# Patient Record
Sex: MALE | Race: ASIAN | Employment: FULL TIME | URBAN - METROPOLITAN AREA
[De-identification: names, ages, dates, MRNs, and addresses within clinical notes are randomized per-mention and may not be internally consistent; named-entity substitution may affect disease eponyms.]

---

## 2017-02-01 PROBLEM — Z20.1 EXPOSURE TO TB: Status: ACTIVE | Noted: 2017-02-01

## 2017-02-01 PROCEDURE — 36415 COLL VENOUS BLD VENIPUNCTURE: CPT | Performed by: FAMILY MEDICINE

## 2017-02-01 PROCEDURE — 86480 TB TEST CELL IMMUN MEASURE: CPT | Performed by: FAMILY MEDICINE

## 2017-10-12 ENCOUNTER — HOSPITAL ENCOUNTER (OUTPATIENT)
Dept: GENERAL RADIOLOGY | Age: 47
Discharge: HOME | End: 2017-10-12
Attending: UROLOGY
Payer: COMMERCIAL

## 2017-10-12 DIAGNOSIS — N20.1 URETERAL STONE: ICD-10-CM

## 2017-10-12 PROCEDURE — 74000 XR ABDOMEN (1 VIEW) (CPT=74000): CPT | Performed by: UROLOGY

## 2017-10-21 ENCOUNTER — HOSPITAL ENCOUNTER (OUTPATIENT)
Dept: ULTRASOUND IMAGING | Age: 47
Discharge: HOME | End: 2017-10-21
Attending: UROLOGY
Payer: COMMERCIAL

## 2017-10-21 DIAGNOSIS — N20.0 KIDNEY STONE: ICD-10-CM

## 2017-10-21 PROCEDURE — 76775 US EXAM ABDO BACK WALL LIM: CPT | Performed by: UROLOGY

## 2017-10-21 PROCEDURE — 76770 US EXAM ABDO BACK WALL COMP: CPT | Performed by: UROLOGY

## 2018-03-22 ENCOUNTER — APPOINTMENT (OUTPATIENT)
Dept: GENERAL RADIOLOGY | Age: 48
End: 2018-03-22
Attending: FAMILY MEDICINE
Payer: COMMERCIAL

## 2018-03-22 ENCOUNTER — HOSPITAL ENCOUNTER (OUTPATIENT)
Age: 48
Discharge: HOME | End: 2018-03-22
Attending: FAMILY MEDICINE
Payer: COMMERCIAL

## 2018-03-22 VITALS
HEART RATE: 87 BPM | TEMPERATURE: 99 F | OXYGEN SATURATION: 97 % | DIASTOLIC BLOOD PRESSURE: 86 MMHG | RESPIRATION RATE: 18 BRPM | SYSTOLIC BLOOD PRESSURE: 127 MMHG

## 2018-03-22 DIAGNOSIS — M54.50 ACUTE LOW BACK PAIN WITHOUT SCIATICA, UNSPECIFIED BACK PAIN LATERALITY: ICD-10-CM

## 2018-03-22 DIAGNOSIS — M51.9 LUMBAR DISC DISEASE: Primary | ICD-10-CM

## 2018-03-22 LAB
POCT BILIRUBIN URINE: NEGATIVE
POCT BLOOD URINE: NEGATIVE
POCT GLUCOSE URINE: NEGATIVE MG/DL
POCT KETONE URINE: NEGATIVE MG/DL
POCT LEUKOCYTE ESTERASE URINE: NEGATIVE
POCT NITRITE URINE: NEGATIVE
POCT PH URINE: 7.5 (ref 5–8)
POCT PROTEIN URINE: NEGATIVE MG/DL
POCT SPECIFIC GRAVITY URINE: 1.02
POCT URINE CLARITY: CLEAR
POCT URINE COLOR: YELLOW
POCT UROBILINOGEN URINE: 0.2 MG/DL

## 2018-03-22 PROCEDURE — 81002 URINALYSIS NONAUTO W/O SCOPE: CPT | Performed by: FAMILY MEDICINE

## 2018-03-22 PROCEDURE — 72110 X-RAY EXAM L-2 SPINE 4/>VWS: CPT | Performed by: FAMILY MEDICINE

## 2018-03-22 PROCEDURE — 99213 OFFICE O/P EST LOW 20 MIN: CPT

## 2018-03-22 PROCEDURE — 99214 OFFICE O/P EST MOD 30 MIN: CPT

## 2018-03-22 RX ORDER — METHYLPREDNISOLONE 4 MG/1
TABLET ORAL
Qty: 1 PACKAGE | Refills: 0 | Status: SHIPPED | OUTPATIENT
Start: 2018-03-22 | End: 2018-05-10 | Stop reason: ALTCHOICE

## 2018-03-22 RX ORDER — TIZANIDINE HYDROCHLORIDE 2 MG/1
2 CAPSULE, GELATIN COATED ORAL 3 TIMES DAILY
Qty: 21 CAPSULE | Refills: 0 | Status: SHIPPED | OUTPATIENT
Start: 2018-03-22 | End: 2018-03-29

## 2018-03-22 NOTE — ED PROVIDER NOTES
Patient Seen in: [facility]    History   Patient presents with:  Back Pain (musculoskeletal)    Stated Complaint: lower back pain     HPI    49 y/o male with previous h/o poliomyelitis presents with c/o Lower back pain for one w and atraumatic. Eyes: Conjunctivae are normal.   Neck: Neck supple. Cardiovascular: Normal rate, regular rhythm and normal heart sounds. Pulmonary/Chest: Effort normal and breath sounds normal. No respiratory distress. He has no wheezes.    Abdominal 9:02     Approved by: Belle Conde DO               MDM     Pt  with acute lower back pain worse with range of motion. Urine dip unremarkable. Unlikely renal stones. X-ray of the lumbar spine shows multilevel disc degeneration and facet arthropathy.   D

## 2018-03-22 NOTE — ED INITIAL ASSESSMENT (HPI)
Pt presents to the immediate care due to right lower back pain x 1 week. Pt does not recall a specific injury. States pain increases with bending forward and certain positions that relieve the pain.  Pt states he has had kidney stones in the past, reports t

## 2019-03-16 ENCOUNTER — HOSPITAL ENCOUNTER (EMERGENCY)
Facility: HOSPITAL | Age: 49
Discharge: LEFT BEFORE DISCHARGE | End: 2019-03-17
Payer: COMMERCIAL

## 2019-03-17 VITALS
HEART RATE: 75 BPM | WEIGHT: 150 LBS | HEIGHT: 65 IN | RESPIRATION RATE: 20 BRPM | OXYGEN SATURATION: 99 % | DIASTOLIC BLOOD PRESSURE: 97 MMHG | BODY MASS INDEX: 24.99 KG/M2 | SYSTOLIC BLOOD PRESSURE: 159 MMHG | TEMPERATURE: 99 F

## 2019-03-17 LAB
ATRIAL RATE: 79 BPM
P AXIS: 47 DEGREES
P-R INTERVAL: 158 MS
Q-T INTERVAL: 386 MS
QRS DURATION: 86 MS
QTC CALCULATION (BEZET): 442 MS
R AXIS: 12 DEGREES
T AXIS: 16 DEGREES
VENTRICULAR RATE: 79 BPM

## 2019-03-17 PROCEDURE — 93005 ELECTROCARDIOGRAM TRACING: CPT

## 2019-03-17 NOTE — ED INITIAL ASSESSMENT (HPI)
Patient reports that about 30 minutes ago he was having some heart burn and then became short of breath. Reports having left chest pain. Denies light headedness, dizziness, nausea, vomiting.

## 2019-08-15 PROBLEM — Z00.00 ROUTINE GENERAL MEDICAL EXAMINATION AT A HEALTH CARE FACILITY: Status: ACTIVE | Noted: 2019-08-15

## 2019-08-15 PROCEDURE — 83013 H PYLORI (C-13) BREATH: CPT | Performed by: FAMILY MEDICINE

## 2019-09-20 ENCOUNTER — WALK IN (OUTPATIENT)
Dept: URGENT CARE | Age: 49
End: 2019-09-20

## 2019-09-20 DIAGNOSIS — Z23 NEED FOR VACCINATION: Primary | ICD-10-CM

## 2019-09-20 PROCEDURE — 90688 IIV4 VACCINE SPLT 0.5 ML IM: CPT | Performed by: NURSE PRACTITIONER

## 2019-09-20 PROCEDURE — 90471 IMMUNIZATION ADMIN: CPT | Performed by: NURSE PRACTITIONER

## 2019-10-25 ENCOUNTER — HOSPITAL ENCOUNTER (OUTPATIENT)
Dept: GENERAL RADIOLOGY | Facility: HOSPITAL | Age: 49
Discharge: HOME | End: 2019-10-25
Attending: UROLOGY
Payer: COMMERCIAL

## 2019-10-25 ENCOUNTER — LAB ENCOUNTER (OUTPATIENT)
Dept: LAB | Facility: HOSPITAL | Age: 49
End: 2019-10-25
Attending: UROLOGY
Payer: COMMERCIAL

## 2019-10-25 DIAGNOSIS — N20.0 KIDNEY STONE: ICD-10-CM

## 2019-10-25 DIAGNOSIS — N40.0 BENIGN PROSTATE HYPERPLASIA: Primary | ICD-10-CM

## 2019-10-25 PROCEDURE — 84153 ASSAY OF PSA TOTAL: CPT

## 2019-10-25 PROCEDURE — 84154 ASSAY OF PSA FREE: CPT

## 2019-10-25 PROCEDURE — 36415 COLL VENOUS BLD VENIPUNCTURE: CPT

## 2019-10-25 PROCEDURE — 74018 RADEX ABDOMEN 1 VIEW: CPT | Performed by: UROLOGY

## 2019-11-11 ENCOUNTER — HOSPITAL ENCOUNTER (OUTPATIENT)
Dept: ULTRASOUND IMAGING | Facility: HOSPITAL | Age: 49
Discharge: HOME | End: 2019-11-11
Attending: UROLOGY
Payer: COMMERCIAL

## 2019-11-11 DIAGNOSIS — N20.0 KIDNEY STONE: ICD-10-CM

## 2019-11-11 PROCEDURE — 76775 US EXAM ABDO BACK WALL LIM: CPT | Performed by: UROLOGY

## 2019-11-14 PROBLEM — M65.341 TRIGGER RING FINGER OF RIGHT HAND: Status: ACTIVE | Noted: 2019-11-14

## 2020-01-30 PROBLEM — M65.341 TRIGGER FINGER, RIGHT RING FINGER: Status: ACTIVE | Noted: 2019-11-14

## 2020-04-28 PROBLEM — U07.1 COVID-19 VIRUS INFECTION: Status: ACTIVE | Noted: 2020-04-28

## 2020-08-30 ENCOUNTER — WALK IN (OUTPATIENT)
Dept: URGENT CARE | Age: 50
End: 2020-08-30

## 2020-08-30 DIAGNOSIS — Z23 INFLUENZA VACCINE NEEDED: Primary | ICD-10-CM

## 2020-08-30 PROCEDURE — 90471 IMMUNIZATION ADMIN: CPT | Performed by: OBSTETRICS & GYNECOLOGY

## 2020-08-30 PROCEDURE — 90686 IIV4 VACC NO PRSV 0.5 ML IM: CPT | Performed by: OBSTETRICS & GYNECOLOGY

## 2020-11-12 ENCOUNTER — LAB ENCOUNTER (OUTPATIENT)
Dept: LAB | Age: 50
End: 2020-11-12
Attending: UROLOGY
Payer: COMMERCIAL

## 2020-11-12 ENCOUNTER — HOSPITAL ENCOUNTER (OUTPATIENT)
Dept: ULTRASOUND IMAGING | Age: 50
Discharge: HOME | End: 2020-11-12
Attending: UROLOGY
Payer: COMMERCIAL

## 2020-11-12 DIAGNOSIS — N20.0 KIDNEY STONE: Primary | ICD-10-CM

## 2020-11-12 DIAGNOSIS — N20.0 KIDNEY STONE: ICD-10-CM

## 2020-11-12 PROCEDURE — 76775 US EXAM ABDO BACK WALL LIM: CPT | Performed by: UROLOGY

## 2020-11-12 PROCEDURE — 84153 ASSAY OF PSA TOTAL: CPT

## 2020-11-12 PROCEDURE — 84154 ASSAY OF PSA FREE: CPT

## 2020-11-12 PROCEDURE — 36415 COLL VENOUS BLD VENIPUNCTURE: CPT

## 2020-12-21 PROBLEM — K21.9 GASTROESOPHAGEAL REFLUX DISEASE, UNSPECIFIED WHETHER ESOPHAGITIS PRESENT: Status: ACTIVE | Noted: 2020-12-21

## 2021-05-13 PROBLEM — J30.1 SEASONAL ALLERGIC RHINITIS DUE TO POLLEN: Status: ACTIVE | Noted: 2021-05-13

## 2022-03-24 PROBLEM — E87.6 HYPOKALEMIA: Status: ACTIVE | Noted: 2022-03-24

## 2022-03-24 PROBLEM — Z99.89 OSA ON CPAP: Status: ACTIVE | Noted: 2022-03-24

## 2022-03-24 PROBLEM — G47.33 OSA ON CPAP: Status: ACTIVE | Noted: 2022-03-24

## 2022-06-27 ENCOUNTER — LAB ENCOUNTER (OUTPATIENT)
Dept: LAB | Facility: HOSPITAL | Age: 52
End: 2022-06-27
Attending: UROLOGY
Payer: COMMERCIAL

## 2022-06-27 ENCOUNTER — HOSPITAL ENCOUNTER (OUTPATIENT)
Dept: ULTRASOUND IMAGING | Facility: HOSPITAL | Age: 52
Discharge: HOME | End: 2022-06-27
Attending: UROLOGY
Payer: COMMERCIAL

## 2022-06-27 DIAGNOSIS — I10 ESSENTIAL HYPERTENSION: ICD-10-CM

## 2022-06-27 DIAGNOSIS — N20.0 KIDNEY STONE: ICD-10-CM

## 2022-06-27 DIAGNOSIS — E78.5 DYSLIPIDEMIA: ICD-10-CM

## 2022-06-27 LAB
ALBUMIN SERPL-MCNC: 3.8 G/DL (ref 3.4–5)
ALBUMIN/GLOB SERPL: 0.9 {RATIO} (ref 1–2)
ALP LIVER SERPL-CCNC: 123 U/L
ALT SERPL-CCNC: 59 U/L
ANION GAP SERPL CALC-SCNC: 8 MMOL/L (ref 0–18)
AST SERPL-CCNC: 29 U/L (ref 15–37)
BILIRUB SERPL-MCNC: 0.5 MG/DL (ref 0.1–2)
BUN BLD-MCNC: 13 MG/DL (ref 7–18)
CALCIUM BLD-MCNC: 9.9 MG/DL (ref 8.5–10.1)
CHLORIDE SERPL-SCNC: 105 MMOL/L (ref 98–112)
CHOLEST SERPL-MCNC: 184 MG/DL (ref ?–200)
CO2 SERPL-SCNC: 25 MMOL/L (ref 21–32)
CREAT BLD-MCNC: 0.72 MG/DL
FASTING PATIENT LIPID ANSWER: NO
FASTING STATUS PATIENT QL REPORTED: NO
GLOBULIN PLAS-MCNC: 4.2 G/DL (ref 2.8–4.4)
GLUCOSE BLD-MCNC: 101 MG/DL (ref 70–99)
HDLC SERPL-MCNC: 45 MG/DL (ref 40–59)
LDLC SERPL CALC-MCNC: 118 MG/DL (ref ?–100)
NONHDLC SERPL-MCNC: 139 MG/DL (ref ?–130)
OSMOLALITY SERPL CALC.SUM OF ELEC: 286 MOSM/KG (ref 275–295)
POTASSIUM SERPL-SCNC: 3.3 MMOL/L (ref 3.5–5.1)
PROT SERPL-MCNC: 8 G/DL (ref 6.4–8.2)
SODIUM SERPL-SCNC: 138 MMOL/L (ref 136–145)
TRIGL SERPL-MCNC: 115 MG/DL (ref 30–149)
VLDLC SERPL CALC-MCNC: 20 MG/DL (ref 0–30)

## 2022-06-27 PROCEDURE — 80061 LIPID PANEL: CPT

## 2022-06-27 PROCEDURE — 36415 COLL VENOUS BLD VENIPUNCTURE: CPT

## 2022-06-27 PROCEDURE — 76775 US EXAM ABDO BACK WALL LIM: CPT | Performed by: UROLOGY

## 2022-06-27 PROCEDURE — 80053 COMPREHEN METABOLIC PANEL: CPT

## 2023-01-01 ENCOUNTER — APPOINTMENT (OUTPATIENT)
Dept: GENERAL RADIOLOGY | Facility: HOSPITAL | Age: 53
DRG: 870 | End: 2023-01-01
Payer: COMMERCIAL

## 2023-01-01 ENCOUNTER — APPOINTMENT (OUTPATIENT)
Dept: GENERAL RADIOLOGY | Facility: HOSPITAL | Age: 53
DRG: 870 | End: 2023-01-01
Attending: INTERNAL MEDICINE
Payer: COMMERCIAL

## 2023-01-01 ENCOUNTER — HOSPITAL ENCOUNTER (INPATIENT)
Facility: HOSPITAL | Age: 53
LOS: 14 days | DRG: 870 | End: 2023-01-01
Attending: EMERGENCY MEDICINE | Admitting: INTERNAL MEDICINE
Payer: COMMERCIAL

## 2023-01-01 ENCOUNTER — APPOINTMENT (OUTPATIENT)
Dept: CT IMAGING | Facility: HOSPITAL | Age: 53
DRG: 870 | End: 2023-01-01
Attending: INTERNAL MEDICINE
Payer: COMMERCIAL

## 2023-01-01 ENCOUNTER — APPOINTMENT (OUTPATIENT)
Dept: CV DIAGNOSTICS | Facility: HOSPITAL | Age: 53
DRG: 870 | End: 2023-01-01
Attending: INTERNAL MEDICINE
Payer: COMMERCIAL

## 2023-01-01 ENCOUNTER — APPOINTMENT (OUTPATIENT)
Dept: GENERAL RADIOLOGY | Facility: HOSPITAL | Age: 53
DRG: 870 | End: 2023-01-01
Attending: NURSE PRACTITIONER
Payer: COMMERCIAL

## 2023-01-01 ENCOUNTER — APPOINTMENT (OUTPATIENT)
Dept: ULTRASOUND IMAGING | Facility: HOSPITAL | Age: 53
DRG: 870 | End: 2023-01-01
Attending: INTERNAL MEDICINE
Payer: COMMERCIAL

## 2023-01-01 ENCOUNTER — APPOINTMENT (OUTPATIENT)
Dept: GENERAL RADIOLOGY | Facility: HOSPITAL | Age: 53
DRG: 870 | End: 2023-01-01
Attending: EMERGENCY MEDICINE
Payer: COMMERCIAL

## 2023-01-01 ENCOUNTER — APPOINTMENT (OUTPATIENT)
Dept: CT IMAGING | Facility: HOSPITAL | Age: 53
DRG: 870 | End: 2023-01-01
Attending: EMERGENCY MEDICINE
Payer: COMMERCIAL

## 2023-01-01 VITALS
TEMPERATURE: 99 F | HEIGHT: 63 IN | BODY MASS INDEX: 32.93 KG/M2 | HEART RATE: 138 BPM | OXYGEN SATURATION: 48 % | RESPIRATION RATE: 13 BRPM | WEIGHT: 185.88 LBS | SYSTOLIC BLOOD PRESSURE: 170 MMHG | DIASTOLIC BLOOD PRESSURE: 92 MMHG

## 2023-01-01 DIAGNOSIS — C22.0 HEPATOCELLULAR CARCINOMA (HCC): ICD-10-CM

## 2023-01-01 DIAGNOSIS — R06.02 SHORTNESS OF BREATH: ICD-10-CM

## 2023-01-01 DIAGNOSIS — D50.0 IRON DEFICIENCY ANEMIA DUE TO CHRONIC BLOOD LOSS: ICD-10-CM

## 2023-01-01 DIAGNOSIS — R00.0 TACHYCARDIA: ICD-10-CM

## 2023-01-01 DIAGNOSIS — D64.9 ANEMIA, UNSPECIFIED TYPE: ICD-10-CM

## 2023-01-01 DIAGNOSIS — J90 PLEURAL EFFUSION: ICD-10-CM

## 2023-01-01 DIAGNOSIS — E87.1 HYPONATREMIA: Primary | ICD-10-CM

## 2023-01-01 LAB
ADENOVIRUS F 40/41 PCR: NEGATIVE
ADENOVIRUS PCR:: NOT DETECTED
ALBUMIN SERPL-MCNC: 1.9 G/DL (ref 3.4–5)
ALBUMIN SERPL-MCNC: 1.9 G/DL (ref 3.4–5)
ALBUMIN SERPL-MCNC: 2 G/DL (ref 3.4–5)
ALBUMIN SERPL-MCNC: 2.1 G/DL (ref 3.4–5)
ALBUMIN SERPL-MCNC: 2.2 G/DL (ref 3.4–5)
ALBUMIN SERPL-MCNC: 2.3 G/DL (ref 3.4–5)
ALBUMIN SERPL-MCNC: 2.4 G/DL (ref 3.4–5)
ALBUMIN SERPL-MCNC: 2.4 G/DL (ref 3.4–5)
ALBUMIN SERPL-MCNC: 2.6 G/DL (ref 3.4–5)
ALBUMIN SERPL-MCNC: 2.7 G/DL (ref 3.4–5)
ALBUMIN SERPL-MCNC: 3.1 G/DL (ref 3.4–5)
ALBUMIN SERPL-MCNC: 3.7 G/DL (ref 3.4–5)
ALBUMIN SERPL-MCNC: 4 G/DL (ref 3.4–5)
ALBUMIN/GLOB SERPL: 0.4 {RATIO} (ref 1–2)
ALP LIVER SERPL-CCNC: 228 U/L
ALP LIVER SERPL-CCNC: 230 U/L
ALP LIVER SERPL-CCNC: 297 U/L
ALP LIVER SERPL-CCNC: 302 U/L
ALP LIVER SERPL-CCNC: 312 U/L
ALP LIVER SERPL-CCNC: 365 U/L
ALT SERPL-CCNC: 174 U/L
ALT SERPL-CCNC: 178 U/L
ALT SERPL-CCNC: 178 U/L
ALT SERPL-CCNC: 202 U/L
ALT SERPL-CCNC: 202 U/L
ALT SERPL-CCNC: 241 U/L
AMMONIA PLAS-MCNC: 27 UMOL/L (ref 11–32)
ANION GAP SERPL CALC-SCNC: 10 MMOL/L (ref 0–18)
ANION GAP SERPL CALC-SCNC: 11 MMOL/L (ref 0–18)
ANION GAP SERPL CALC-SCNC: 13 MMOL/L (ref 0–18)
ANION GAP SERPL CALC-SCNC: 3 MMOL/L (ref 0–18)
ANION GAP SERPL CALC-SCNC: 5 MMOL/L (ref 0–18)
ANION GAP SERPL CALC-SCNC: 5 MMOL/L (ref 0–18)
ANION GAP SERPL CALC-SCNC: 6 MMOL/L (ref 0–18)
ANION GAP SERPL CALC-SCNC: 7 MMOL/L (ref 0–18)
ANION GAP SERPL CALC-SCNC: 8 MMOL/L (ref 0–18)
ANION GAP SERPL CALC-SCNC: 9 MMOL/L (ref 0–18)
ANTIBODY SCREEN: NEGATIVE
ANTIBODY SCREEN: NEGATIVE
APTT PPP: 47.9 SECONDS (ref 23.3–35.6)
ARTERIAL PATENCY WRIST A: POSITIVE
ASPER FLAVUS: NEGATIVE
ASPER FUMIGATUS: NEGATIVE
ASPER NIGER: NEGATIVE
ASPERGILLUS AG BAL/SERUM: 0.08 INDEX
AST SERPL-CCNC: 110 U/L (ref 15–37)
AST SERPL-CCNC: 86 U/L (ref 15–37)
AST SERPL-CCNC: 95 U/L (ref 15–37)
AST SERPL-CCNC: 96 U/L (ref 15–37)
AST SERPL-CCNC: 98 U/L (ref 15–37)
AST SERPL-CCNC: 98 U/L (ref 15–37)
ASTROVIRUS PCR: NEGATIVE
ATRIAL RATE: 128 BPM
ATRIAL RATE: 144 BPM
ATRIAL RATE: 78 BPM
B PARAPERT DNA SPEC QL NAA+PROBE: NOT DETECTED
B PERT DNA SPEC QL NAA+PROBE: NOT DETECTED
BASE EXCESS BLDA CALC-SCNC: -0.3 MMOL/L (ref ?–2)
BASE EXCESS BLDA CALC-SCNC: -11.4 MMOL/L (ref ?–2)
BASE EXCESS BLDA CALC-SCNC: -11.5 MMOL/L (ref ?–2)
BASE EXCESS BLDA CALC-SCNC: -11.6 MMOL/L (ref ?–2)
BASE EXCESS BLDA CALC-SCNC: -12.4 MMOL/L (ref ?–2)
BASE EXCESS BLDA CALC-SCNC: -12.4 MMOL/L (ref ?–2)
BASE EXCESS BLDA CALC-SCNC: -13.4 MMOL/L (ref ?–2)
BASE EXCESS BLDA CALC-SCNC: -6.5 MMOL/L (ref ?–2)
BASE EXCESS BLDA CALC-SCNC: -6.9 MMOL/L (ref ?–2)
BASE EXCESS BLDA CALC-SCNC: -8.3 MMOL/L (ref ?–2)
BASE EXCESS BLDA CALC-SCNC: 5 MMOL/L (ref ?–2)
BASE EXCESS BLDA CALC-SCNC: 5.4 MMOL/L (ref ?–2)
BASE EXCESS BLDA CALC-SCNC: 6 MMOL/L (ref ?–2)
BASOPHILS # BLD AUTO: 0.01 X10(3) UL (ref 0–0.2)
BASOPHILS # BLD AUTO: 0.02 X10(3) UL (ref 0–0.2)
BASOPHILS # BLD AUTO: 0.03 X10(3) UL (ref 0–0.2)
BASOPHILS # BLD AUTO: 0.04 X10(3) UL (ref 0–0.2)
BASOPHILS # BLD AUTO: 0.04 X10(3) UL (ref 0–0.2)
BASOPHILS # BLD AUTO: 0.05 X10(3) UL (ref 0–0.2)
BASOPHILS # BLD: 0 X10(3) UL (ref 0–0.2)
BASOPHILS NFR BLD AUTO: 0.1 %
BASOPHILS NFR BLD AUTO: 0.3 %
BASOPHILS NFR BLD AUTO: 0.6 %
BASOPHILS NFR BLD AUTO: 0.6 %
BASOPHILS NFR BLD AUTO: 0.9 %
BASOPHILS NFR BLD: 0 %
BILIRUB DIRECT SERPL-MCNC: 0.7 MG/DL (ref 0–0.2)
BILIRUB DIRECT SERPL-MCNC: 0.7 MG/DL (ref 0–0.2)
BILIRUB SERPL-MCNC: 0.6 MG/DL (ref 0.1–2)
BILIRUB SERPL-MCNC: 0.8 MG/DL (ref 0.1–2)
BILIRUB SERPL-MCNC: 0.8 MG/DL (ref 0.1–2)
BILIRUB SERPL-MCNC: 1.1 MG/DL (ref 0.1–2)
BILIRUB SERPL-MCNC: 1.1 MG/DL (ref 0.1–2)
BILIRUB SERPL-MCNC: 1.3 MG/DL (ref 0.1–2)
BILIRUB UR QL STRIP.AUTO: NEGATIVE
BLASTOMYCES ABS QN DID: NEGATIVE
BLASTOMYCES ABS QN DID: NEGATIVE
BLOOD TYPE BARCODE: 5100
BLOOD TYPE BARCODE: 5100
BODY TEMPERATURE: 95.5 F
BODY TEMPERATURE: 97.5 F
BODY TEMPERATURE: 98.6 F
BODY TEMPERATURE: 99.5 F
BUN BLD-MCNC: 102 MG/DL (ref 7–18)
BUN BLD-MCNC: 106 MG/DL (ref 7–18)
BUN BLD-MCNC: 114 MG/DL (ref 7–18)
BUN BLD-MCNC: 137 MG/DL (ref 7–18)
BUN BLD-MCNC: 17 MG/DL (ref 7–18)
BUN BLD-MCNC: 20 MG/DL (ref 7–18)
BUN BLD-MCNC: 21 MG/DL (ref 7–18)
BUN BLD-MCNC: 23 MG/DL (ref 7–18)
BUN BLD-MCNC: 26 MG/DL (ref 7–18)
BUN BLD-MCNC: 32 MG/DL (ref 7–18)
BUN BLD-MCNC: 36 MG/DL (ref 7–18)
BUN BLD-MCNC: 48 MG/DL (ref 7–18)
BUN BLD-MCNC: 48 MG/DL (ref 7–18)
BUN BLD-MCNC: 66 MG/DL (ref 7–18)
BUN BLD-MCNC: 79 MG/DL (ref 7–18)
BUN BLD-MCNC: 79 MG/DL (ref 7–18)
BUN BLD-MCNC: 86 MG/DL (ref 7–18)
BUN BLD-MCNC: 86 MG/DL (ref 7–18)
BUN BLD-MCNC: 87 MG/DL (ref 7–18)
BUN BLD-MCNC: 89 MG/DL (ref 7–18)
BUN BLD-MCNC: 89 MG/DL (ref 7–18)
BUN BLD-MCNC: 92 MG/DL (ref 7–18)
C CAYETANENSIS DNA SPEC QL NAA+PROBE: NEGATIVE
C DIFF TOX B STL QL: NEGATIVE
C PNEUM DNA SPEC QL NAA+PROBE: NOT DETECTED
CA-I BLD-SCNC: 1.07 MMOL/L (ref 0.95–1.32)
CA-I BLD-SCNC: 1.07 MMOL/L (ref 0.95–1.32)
CA-I BLD-SCNC: 1.08 MMOL/L (ref 0.95–1.32)
CA-I BLD-SCNC: 1.11 MMOL/L (ref 0.95–1.32)
CA-I BLD-SCNC: 1.15 MMOL/L (ref 0.95–1.32)
CA-I BLD-SCNC: 1.16 MMOL/L (ref 0.95–1.32)
CA-I BLD-SCNC: 1.19 MMOL/L (ref 0.95–1.32)
CA-I BLD-SCNC: 1.21 MMOL/L (ref 0.95–1.32)
CA-I BLD-SCNC: 1.23 MMOL/L (ref 0.95–1.32)
CA-I BLD-SCNC: 1.23 MMOL/L (ref 0.95–1.32)
CA-I BLD-SCNC: 1.24 MMOL/L (ref 0.95–1.32)
CA-I BLD-SCNC: 1.25 MMOL/L (ref 0.95–1.32)
CALCIUM BLD-MCNC: 7.8 MG/DL (ref 8.5–10.1)
CALCIUM BLD-MCNC: 7.9 MG/DL (ref 8.5–10.1)
CALCIUM BLD-MCNC: 8 MG/DL (ref 8.5–10.1)
CALCIUM BLD-MCNC: 8 MG/DL (ref 8.5–10.1)
CALCIUM BLD-MCNC: 8.1 MG/DL (ref 8.5–10.1)
CALCIUM BLD-MCNC: 8.2 MG/DL (ref 8.5–10.1)
CALCIUM BLD-MCNC: 8.2 MG/DL (ref 8.5–10.1)
CALCIUM BLD-MCNC: 8.3 MG/DL (ref 8.5–10.1)
CALCIUM BLD-MCNC: 8.4 MG/DL (ref 8.5–10.1)
CALCIUM BLD-MCNC: 8.6 MG/DL (ref 8.5–10.1)
CALCIUM BLD-MCNC: 8.6 MG/DL (ref 8.5–10.1)
CALCIUM BLD-MCNC: 8.7 MG/DL (ref 8.5–10.1)
CALCIUM BLD-MCNC: 8.8 MG/DL (ref 8.5–10.1)
CALCIUM BLD-MCNC: 8.9 MG/DL (ref 8.5–10.1)
CALCIUM BLD-MCNC: 8.9 MG/DL (ref 8.5–10.1)
CAMPY SP DNA.DIARRHEA STL QL NAA+PROBE: NEGATIVE
CHLORIDE SERPL-SCNC: 102 MMOL/L (ref 98–112)
CHLORIDE SERPL-SCNC: 103 MMOL/L (ref 98–112)
CHLORIDE SERPL-SCNC: 103 MMOL/L (ref 98–112)
CHLORIDE SERPL-SCNC: 104 MMOL/L (ref 98–112)
CHLORIDE SERPL-SCNC: 106 MMOL/L (ref 98–112)
CHLORIDE SERPL-SCNC: 108 MMOL/L (ref 98–112)
CHLORIDE SERPL-SCNC: 109 MMOL/L (ref 98–112)
CHLORIDE SERPL-SCNC: 111 MMOL/L (ref 98–112)
CHLORIDE SERPL-SCNC: 113 MMOL/L (ref 98–112)
CHLORIDE SERPL-SCNC: 115 MMOL/L (ref 98–112)
CHLORIDE SERPL-SCNC: 89 MMOL/L (ref 98–112)
CHLORIDE SERPL-SCNC: 93 MMOL/L (ref 98–112)
CHLORIDE SERPL-SCNC: 95 MMOL/L (ref 98–112)
CHLORIDE SERPL-SCNC: 95 MMOL/L (ref 98–112)
CLARITY UR REFRACT.AUTO: CLEAR
CMV PCR BAL: NOT DETECTED IU/ML
CO2 SERPL-SCNC: 12 MMOL/L (ref 21–32)
CO2 SERPL-SCNC: 13 MMOL/L (ref 21–32)
CO2 SERPL-SCNC: 14 MMOL/L (ref 21–32)
CO2 SERPL-SCNC: 14 MMOL/L (ref 21–32)
CO2 SERPL-SCNC: 15 MMOL/L (ref 21–32)
CO2 SERPL-SCNC: 16 MMOL/L (ref 21–32)
CO2 SERPL-SCNC: 17 MMOL/L (ref 21–32)
CO2 SERPL-SCNC: 17 MMOL/L (ref 21–32)
CO2 SERPL-SCNC: 19 MMOL/L (ref 21–32)
CO2 SERPL-SCNC: 23 MMOL/L (ref 21–32)
CO2 SERPL-SCNC: 23 MMOL/L (ref 21–32)
CO2 SERPL-SCNC: 26 MMOL/L (ref 21–32)
CO2 SERPL-SCNC: 27 MMOL/L (ref 21–32)
CO2 SERPL-SCNC: 27 MMOL/L (ref 21–32)
CO2 SERPL-SCNC: 28 MMOL/L (ref 21–32)
CO2 SERPL-SCNC: 31 MMOL/L (ref 21–32)
COHGB MFR BLD: 0.5 % SAT (ref 0–3)
COHGB MFR BLD: 0.8 % SAT (ref 0–3)
COHGB MFR BLD: 0.8 % SAT (ref 0–3)
COHGB MFR BLD: 0.9 % SAT (ref 0–3)
COHGB MFR BLD: 0.9 % SAT (ref 0–3)
COHGB MFR BLD: 1 % SAT (ref 0–3)
COHGB MFR BLD: 1 % SAT (ref 0–3)
COHGB MFR BLD: 1.2 % SAT (ref 0–3)
COHGB MFR BLD: 1.2 % SAT (ref 0–3)
COHGB MFR BLD: 1.6 % SAT (ref 0–3)
COHGB MFR BLD: 1.7 % SAT (ref 0–3)
COHGB MFR BLD: 1.7 % SAT (ref 0–3)
COLOR FLD: COLORLESS
COLOR UR AUTO: YELLOW
CORONAVIRUS 229E PCR:: NOT DETECTED
CORONAVIRUS HKU1 PCR:: NOT DETECTED
CORONAVIRUS NL63 PCR:: NOT DETECTED
CORONAVIRUS OC43 PCR:: NOT DETECTED
CPAP: 7 CM H2O
CPAP: 8 CM H2O
CREAT BLD-MCNC: 0.62 MG/DL
CREAT BLD-MCNC: 0.63 MG/DL
CREAT BLD-MCNC: 0.63 MG/DL
CREAT BLD-MCNC: 0.64 MG/DL
CREAT BLD-MCNC: 0.65 MG/DL
CREAT BLD-MCNC: 0.74 MG/DL
CREAT BLD-MCNC: 0.75 MG/DL
CREAT BLD-MCNC: 0.81 MG/DL
CREAT BLD-MCNC: 0.94 MG/DL
CREAT BLD-MCNC: 0.96 MG/DL
CREAT BLD-MCNC: 1.12 MG/DL
CREAT BLD-MCNC: 1.12 MG/DL
CREAT BLD-MCNC: 1.29 MG/DL
CREAT BLD-MCNC: 1.3 MG/DL
CREAT BLD-MCNC: 1.43 MG/DL
CREAT BLD-MCNC: 1.54 MG/DL
CREAT BLD-MCNC: 1.63 MG/DL
CREAT BLD-MCNC: 1.63 MG/DL
CREAT BLD-MCNC: 1.69 MG/DL
CREAT BLD-MCNC: 1.72 MG/DL
CREAT BLD-MCNC: 1.84 MG/DL
CREAT BLD-MCNC: 2.21 MG/DL
CREAT UR-SCNC: 86.2 MG/DL
CRYPTOSP DNA SPEC QL NAA+PROBE: NEGATIVE
D DIMER PPP FEU-MCNC: 4.73 UG/ML FEU (ref ?–0.54)
DEPRECATED HBV CORE AB SER IA-ACNC: 2943.9 NG/ML
DIRECT COOMBS POLY: NEGATIVE
EAEC PAA PLAS AGGR+AATA ST NAA+NON-PRB: NEGATIVE
EC STX1+STX2 + H7 FLIC SPEC NAA+PROBE: NEGATIVE
EGFRCR SERPLBLD CKD-EPI 2021: 105 ML/MIN/1.73M2 (ref 60–?)
EGFRCR SERPLBLD CKD-EPI 2021: 107 ML/MIN/1.73M2 (ref 60–?)
EGFRCR SERPLBLD CKD-EPI 2021: 108 ML/MIN/1.73M2 (ref 60–?)
EGFRCR SERPLBLD CKD-EPI 2021: 112 ML/MIN/1.73M2 (ref 60–?)
EGFRCR SERPLBLD CKD-EPI 2021: 112 ML/MIN/1.73M2 (ref 60–?)
EGFRCR SERPLBLD CKD-EPI 2021: 113 ML/MIN/1.73M2 (ref 60–?)
EGFRCR SERPLBLD CKD-EPI 2021: 113 ML/MIN/1.73M2 (ref 60–?)
EGFRCR SERPLBLD CKD-EPI 2021: 114 ML/MIN/1.73M2 (ref 60–?)
EGFRCR SERPLBLD CKD-EPI 2021: 35 ML/MIN/1.73M2 (ref 60–?)
EGFRCR SERPLBLD CKD-EPI 2021: 43 ML/MIN/1.73M2 (ref 60–?)
EGFRCR SERPLBLD CKD-EPI 2021: 47 ML/MIN/1.73M2 (ref 60–?)
EGFRCR SERPLBLD CKD-EPI 2021: 48 ML/MIN/1.73M2 (ref 60–?)
EGFRCR SERPLBLD CKD-EPI 2021: 50 ML/MIN/1.73M2 (ref 60–?)
EGFRCR SERPLBLD CKD-EPI 2021: 50 ML/MIN/1.73M2 (ref 60–?)
EGFRCR SERPLBLD CKD-EPI 2021: 53 ML/MIN/1.73M2 (ref 60–?)
EGFRCR SERPLBLD CKD-EPI 2021: 58 ML/MIN/1.73M2 (ref 60–?)
EGFRCR SERPLBLD CKD-EPI 2021: 65 ML/MIN/1.73M2 (ref 60–?)
EGFRCR SERPLBLD CKD-EPI 2021: 66 ML/MIN/1.73M2 (ref 60–?)
EGFRCR SERPLBLD CKD-EPI 2021: 78 ML/MIN/1.73M2 (ref 60–?)
EGFRCR SERPLBLD CKD-EPI 2021: 78 ML/MIN/1.73M2 (ref 60–?)
EGFRCR SERPLBLD CKD-EPI 2021: 94 ML/MIN/1.73M2 (ref 60–?)
EGFRCR SERPLBLD CKD-EPI 2021: 96 ML/MIN/1.73M2 (ref 60–?)
ENTAMOEBA HISTOLYTICA PCR: NEGATIVE
EOSINOPHIL # BLD AUTO: 0 X10(3) UL (ref 0–0.7)
EOSINOPHIL # BLD AUTO: 0.01 X10(3) UL (ref 0–0.7)
EOSINOPHIL # BLD AUTO: 0.03 X10(3) UL (ref 0–0.7)
EOSINOPHIL # BLD AUTO: 0.03 X10(3) UL (ref 0–0.7)
EOSINOPHIL # BLD: 0 X10(3) UL (ref 0–0.7)
EOSINOPHIL NFR BLD AUTO: 0 %
EOSINOPHIL NFR BLD AUTO: 0.1 %
EOSINOPHIL NFR BLD AUTO: 0.2 %
EOSINOPHIL NFR BLD AUTO: 0.5 %
EOSINOPHIL NFR BLD: 0 %
EPEC EAE GENE STL QL NAA+NON-PROBE: NEGATIVE
ERYTHROCYTE [DISTWIDTH] IN BLOOD BY AUTOMATED COUNT: 14.8 %
ERYTHROCYTE [DISTWIDTH] IN BLOOD BY AUTOMATED COUNT: 15 %
ERYTHROCYTE [DISTWIDTH] IN BLOOD BY AUTOMATED COUNT: 15.3 %
ERYTHROCYTE [DISTWIDTH] IN BLOOD BY AUTOMATED COUNT: 15.9 %
ERYTHROCYTE [DISTWIDTH] IN BLOOD BY AUTOMATED COUNT: 16 %
ERYTHROCYTE [DISTWIDTH] IN BLOOD BY AUTOMATED COUNT: 16.5 %
ERYTHROCYTE [DISTWIDTH] IN BLOOD BY AUTOMATED COUNT: 17.2 %
ERYTHROCYTE [DISTWIDTH] IN BLOOD BY AUTOMATED COUNT: 17.6 %
ERYTHROCYTE [DISTWIDTH] IN BLOOD BY AUTOMATED COUNT: 17.9 %
ERYTHROCYTE [DISTWIDTH] IN BLOOD BY AUTOMATED COUNT: 17.9 %
ERYTHROCYTE [DISTWIDTH] IN BLOOD BY AUTOMATED COUNT: 18.4 %
ERYTHROCYTE [DISTWIDTH] IN BLOOD BY AUTOMATED COUNT: 18.8 %
ERYTHROCYTE [DISTWIDTH] IN BLOOD BY AUTOMATED COUNT: 19.5 %
ERYTHROCYTE [DISTWIDTH] IN BLOOD BY AUTOMATED COUNT: 20.3 %
ERYTHROCYTE [DISTWIDTH] IN BLOOD BY AUTOMATED COUNT: 20.8 %
ERYTHROCYTE [DISTWIDTH] IN BLOOD BY AUTOMATED COUNT: 21.2 %
EST. AVERAGE GLUCOSE BLD GHB EST-MCNC: 131 MG/DL (ref 68–126)
ETEC LTA+ST1A+ST1B TOX ST NAA+NON-PROBE: NEGATIVE
EXPIRATORY PRESSURE: 5 CM H2O
FIO2: 50 %
FIO2: 60 %
FIO2: 80 %
FLUAV RNA SPEC QL NAA+PROBE: NOT DETECTED
FLUBV RNA SPEC QL NAA+PROBE: NOT DETECTED
FUNGITELL: <31 PG/ML
GIARDIA LAMBLIA PCR: NEGATIVE
GLOBULIN PLAS-MCNC: 4.8 G/DL (ref 2.8–4.4)
GLOBULIN PLAS-MCNC: 4.8 G/DL (ref 2.8–4.4)
GLOBULIN PLAS-MCNC: 4.9 G/DL (ref 2.8–4.4)
GLOBULIN PLAS-MCNC: 5.6 G/DL (ref 2.8–4.4)
GLUCOSE BLD-MCNC: 103 MG/DL (ref 70–99)
GLUCOSE BLD-MCNC: 103 MG/DL (ref 70–99)
GLUCOSE BLD-MCNC: 108 MG/DL (ref 70–99)
GLUCOSE BLD-MCNC: 111 MG/DL (ref 70–99)
GLUCOSE BLD-MCNC: 118 MG/DL (ref 70–99)
GLUCOSE BLD-MCNC: 125 MG/DL (ref 70–99)
GLUCOSE BLD-MCNC: 132 MG/DL (ref 70–99)
GLUCOSE BLD-MCNC: 137 MG/DL (ref 70–99)
GLUCOSE BLD-MCNC: 138 MG/DL (ref 70–99)
GLUCOSE BLD-MCNC: 151 MG/DL (ref 70–99)
GLUCOSE BLD-MCNC: 161 MG/DL (ref 70–99)
GLUCOSE BLD-MCNC: 161 MG/DL (ref 70–99)
GLUCOSE BLD-MCNC: 169 MG/DL (ref 70–99)
GLUCOSE BLD-MCNC: 185 MG/DL (ref 70–99)
GLUCOSE BLD-MCNC: 196 MG/DL (ref 70–99)
GLUCOSE BLD-MCNC: 197 MG/DL (ref 70–99)
GLUCOSE BLD-MCNC: 197 MG/DL (ref 70–99)
GLUCOSE BLD-MCNC: 198 MG/DL (ref 70–99)
GLUCOSE BLD-MCNC: 201 MG/DL (ref 70–99)
GLUCOSE BLD-MCNC: 203 MG/DL (ref 70–99)
GLUCOSE BLD-MCNC: 204 MG/DL (ref 70–99)
GLUCOSE BLD-MCNC: 206 MG/DL (ref 70–99)
GLUCOSE BLD-MCNC: 206 MG/DL (ref 70–99)
GLUCOSE BLD-MCNC: 207 MG/DL (ref 70–99)
GLUCOSE BLD-MCNC: 208 MG/DL (ref 70–99)
GLUCOSE BLD-MCNC: 209 MG/DL (ref 70–99)
GLUCOSE BLD-MCNC: 209 MG/DL (ref 70–99)
GLUCOSE BLD-MCNC: 212 MG/DL (ref 70–99)
GLUCOSE BLD-MCNC: 213 MG/DL (ref 70–99)
GLUCOSE BLD-MCNC: 213 MG/DL (ref 70–99)
GLUCOSE BLD-MCNC: 215 MG/DL (ref 70–99)
GLUCOSE BLD-MCNC: 216 MG/DL (ref 70–99)
GLUCOSE BLD-MCNC: 220 MG/DL (ref 70–99)
GLUCOSE BLD-MCNC: 221 MG/DL (ref 70–99)
GLUCOSE BLD-MCNC: 221 MG/DL (ref 70–99)
GLUCOSE BLD-MCNC: 223 MG/DL (ref 70–99)
GLUCOSE BLD-MCNC: 224 MG/DL (ref 70–99)
GLUCOSE BLD-MCNC: 225 MG/DL (ref 70–99)
GLUCOSE BLD-MCNC: 231 MG/DL (ref 70–99)
GLUCOSE BLD-MCNC: 258 MG/DL (ref 70–99)
GLUCOSE BLD-MCNC: 266 MG/DL (ref 70–99)
GLUCOSE BLD-MCNC: 267 MG/DL (ref 70–99)
GLUCOSE BLD-MCNC: 269 MG/DL (ref 70–99)
GLUCOSE BLD-MCNC: 270 MG/DL (ref 70–99)
GLUCOSE BLD-MCNC: 287 MG/DL (ref 70–99)
GLUCOSE BLD-MCNC: 302 MG/DL (ref 70–99)
GLUCOSE BLD-MCNC: 318 MG/DL (ref 70–99)
GLUCOSE BLD-MCNC: 319 MG/DL (ref 70–99)
GLUCOSE BLD-MCNC: 365 MG/DL (ref 70–99)
GLUCOSE UR STRIP.AUTO-MCNC: 50 MG/DL
HAPTOGLOB SERPL-MCNC: 160 MG/DL (ref 30–200)
HBA1C MFR BLD: 6.2 % (ref ?–5.7)
HCO3 BLDA-SCNC: 14.3 MEQ/L (ref 21–27)
HCO3 BLDA-SCNC: 15.3 MEQ/L (ref 21–27)
HCO3 BLDA-SCNC: 15.3 MEQ/L (ref 21–27)
HCO3 BLDA-SCNC: 15.9 MEQ/L (ref 21–27)
HCO3 BLDA-SCNC: 16 MEQ/L (ref 21–27)
HCO3 BLDA-SCNC: 16.1 MEQ/L (ref 21–27)
HCO3 BLDA-SCNC: 18.5 MEQ/L (ref 21–27)
HCO3 BLDA-SCNC: 19.6 MEQ/L (ref 21–27)
HCO3 BLDA-SCNC: 19.9 MEQ/L (ref 21–27)
HCO3 BLDA-SCNC: 24.7 MEQ/L (ref 21–27)
HCO3 BLDA-SCNC: 28.8 MEQ/L (ref 21–27)
HCO3 BLDA-SCNC: 29.2 MEQ/L (ref 21–27)
HCO3 BLDA-SCNC: 29.6 MEQ/L (ref 21–27)
HCT VFR BLD AUTO: 21.4 %
HCT VFR BLD AUTO: 22.2 %
HCT VFR BLD AUTO: 22.8 %
HCT VFR BLD AUTO: 23 %
HCT VFR BLD AUTO: 23.3 %
HCT VFR BLD AUTO: 23.5 %
HCT VFR BLD AUTO: 23.6 %
HCT VFR BLD AUTO: 23.7 %
HCT VFR BLD AUTO: 24.3 %
HCT VFR BLD AUTO: 24.7 %
HCT VFR BLD AUTO: 26.3 %
HCT VFR BLD AUTO: 29.1 %
HGB BLD-MCNC: 10.2 G/DL
HGB BLD-MCNC: 6.7 G/DL
HGB BLD-MCNC: 7 G/DL
HGB BLD-MCNC: 7.2 G/DL
HGB BLD-MCNC: 7.2 G/DL
HGB BLD-MCNC: 7.3 G/DL
HGB BLD-MCNC: 7.4 G/DL
HGB BLD-MCNC: 7.5 G/DL
HGB BLD-MCNC: 7.5 G/DL
HGB BLD-MCNC: 7.6 G/DL
HGB BLD-MCNC: 7.6 G/DL
HGB BLD-MCNC: 7.7 G/DL
HGB BLD-MCNC: 7.8 G/DL
HGB BLD-MCNC: 7.8 G/DL
HGB BLD-MCNC: 7.9 G/DL
HGB BLD-MCNC: 8 G/DL
HGB BLD-MCNC: 8.1 G/DL
HGB BLD-MCNC: 8.1 G/DL
HGB BLD-MCNC: 8.3 G/DL
HGB BLD-MCNC: 8.5 G/DL
HGB BLD-MCNC: 8.7 G/DL
HGB BLD-MCNC: 9.5 G/DL
HGB RETIC QN AUTO: 31.3 PG (ref 28.2–36.6)
HSV 1 PCR BAL: NOT DETECTED COPIES/ML
HSV 2 PCR BAL: NOT DETECTED COPIES/ML
IMM GRANULOCYTES # BLD AUTO: 0.02 X10(3) UL (ref 0–1)
IMM GRANULOCYTES # BLD AUTO: 0.03 X10(3) UL (ref 0–1)
IMM GRANULOCYTES # BLD AUTO: 0.06 X10(3) UL (ref 0–1)
IMM GRANULOCYTES # BLD AUTO: 0.07 X10(3) UL (ref 0–1)
IMM GRANULOCYTES # BLD AUTO: 0.1 X10(3) UL (ref 0–1)
IMM GRANULOCYTES # BLD AUTO: 0.11 X10(3) UL (ref 0–1)
IMM GRANULOCYTES # BLD AUTO: 0.14 X10(3) UL (ref 0–1)
IMM GRANULOCYTES # BLD AUTO: 0.3 X10(3) UL (ref 0–1)
IMM GRANULOCYTES # BLD AUTO: 0.39 X10(3) UL (ref 0–1)
IMM GRANULOCYTES # BLD AUTO: 0.49 X10(3) UL (ref 0–1)
IMM GRANULOCYTES NFR BLD: 0.3 %
IMM GRANULOCYTES NFR BLD: 0.6 %
IMM GRANULOCYTES NFR BLD: 0.7 %
IMM GRANULOCYTES NFR BLD: 0.8 %
IMM GRANULOCYTES NFR BLD: 0.9 %
IMM GRANULOCYTES NFR BLD: 1 %
IMM GRANULOCYTES NFR BLD: 1.1 %
IMM GRANULOCYTES NFR BLD: 1.8 %
IMM GRANULOCYTES NFR BLD: 2.7 %
IMM GRANULOCYTES NFR BLD: 4.2 %
IMM RETICS NFR: 0.2 RATIO (ref 0.1–0.3)
INR BLD: 1.57 (ref 0.85–1.16)
INSP PRESSURE: 12 CM H2O
INSPIRATION SETTING TIME VENT: 1 %
IRON SATN MFR SERPL: 20 %
IRON SERPL-MCNC: 42 UG/DL
KETONES UR STRIP.AUTO-MCNC: NEGATIVE MG/DL
L PNEUMO AG UR QL: NEGATIVE
L/M: 2 L/MIN
L/M: 2 L/MIN
L/M: 4 L/MIN
L/M: 7 L/MIN
LACTATE BLD-SCNC: 0.8 MMOL/L (ref 0.5–2)
LACTATE BLD-SCNC: 0.8 MMOL/L (ref 0.5–2)
LACTATE BLD-SCNC: 1 MMOL/L (ref 0.5–2)
LACTATE BLD-SCNC: 1 MMOL/L (ref 0.5–2)
LACTATE BLD-SCNC: 1.1 MMOL/L (ref 0.5–2)
LACTATE BLD-SCNC: 1.1 MMOL/L (ref 0.5–2)
LACTATE BLD-SCNC: 1.2 MMOL/L (ref 0.5–2)
LACTATE BLD-SCNC: 1.2 MMOL/L (ref 0.5–2)
LACTATE BLD-SCNC: 1.6 MMOL/L (ref 0.5–2)
LACTATE BLD-SCNC: 1.6 MMOL/L (ref 0.5–2)
LACTATE BLD-SCNC: 2 MMOL/L (ref 0.5–2)
LACTATE BLD-SCNC: 2.2 MMOL/L (ref 0.5–2)
LACTATE SERPL-SCNC: 1.4 MMOL/L (ref 0.4–2)
LACTATE SERPL-SCNC: 1.6 MMOL/L (ref 0.4–2)
LEUKOCYTE ESTERASE UR QL STRIP.AUTO: NEGATIVE
LYMPHOCYTES # BLD AUTO: 0.19 X10(3) UL (ref 1–4)
LYMPHOCYTES # BLD AUTO: 0.58 X10(3) UL (ref 1–4)
LYMPHOCYTES # BLD AUTO: 0.76 X10(3) UL (ref 1–4)
LYMPHOCYTES # BLD AUTO: 0.86 X10(3) UL (ref 1–4)
LYMPHOCYTES # BLD AUTO: 0.89 X10(3) UL (ref 1–4)
LYMPHOCYTES # BLD AUTO: 0.89 X10(3) UL (ref 1–4)
LYMPHOCYTES # BLD AUTO: 1.54 X10(3) UL (ref 1–4)
LYMPHOCYTES # BLD AUTO: 1.57 X10(3) UL (ref 1–4)
LYMPHOCYTES # BLD AUTO: 1.86 X10(3) UL (ref 1–4)
LYMPHOCYTES # BLD AUTO: 2.18 X10(3) UL (ref 1–4)
LYMPHOCYTES NFR BLD AUTO: 1.1 %
LYMPHOCYTES NFR BLD AUTO: 11.1 %
LYMPHOCYTES NFR BLD AUTO: 13.2 %
LYMPHOCYTES NFR BLD AUTO: 14.4 %
LYMPHOCYTES NFR BLD AUTO: 15.5 %
LYMPHOCYTES NFR BLD AUTO: 15.8 %
LYMPHOCYTES NFR BLD AUTO: 16.2 %
LYMPHOCYTES NFR BLD AUTO: 16.2 %
LYMPHOCYTES NFR BLD AUTO: 4.9 %
LYMPHOCYTES NFR BLD AUTO: 6.1 %
LYMPHOCYTES NFR BLD: 0.51 X10(3) UL (ref 1–4)
LYMPHOCYTES NFR BLD: 3 %
MAGNESIUM SERPL-MCNC: 1.8 MG/DL (ref 1.6–2.6)
MAGNESIUM SERPL-MCNC: 1.9 MG/DL (ref 1.6–2.6)
MAGNESIUM SERPL-MCNC: 3 MG/DL (ref 1.6–2.6)
MAGNESIUM SERPL-MCNC: 3.1 MG/DL (ref 1.6–2.6)
MAGNESIUM SERPL-MCNC: 3.2 MG/DL (ref 1.6–2.6)
MAGNESIUM SERPL-MCNC: 3.3 MG/DL (ref 1.6–2.6)
MCH RBC QN AUTO: 26.8 PG (ref 26–34)
MCH RBC QN AUTO: 27.1 PG (ref 26–34)
MCH RBC QN AUTO: 27.2 PG (ref 26–34)
MCH RBC QN AUTO: 27.2 PG (ref 26–34)
MCH RBC QN AUTO: 27.4 PG (ref 26–34)
MCH RBC QN AUTO: 27.5 PG (ref 26–34)
MCH RBC QN AUTO: 27.6 PG (ref 26–34)
MCH RBC QN AUTO: 27.6 PG (ref 26–34)
MCH RBC QN AUTO: 27.7 PG (ref 26–34)
MCH RBC QN AUTO: 27.7 PG (ref 26–34)
MCH RBC QN AUTO: 28.3 PG (ref 26–34)
MCH RBC QN AUTO: 28.3 PG (ref 26–34)
MCH RBC QN AUTO: 28.5 PG (ref 26–34)
MCH RBC QN AUTO: 28.6 PG (ref 26–34)
MCH RBC QN AUTO: 28.8 PG (ref 26–34)
MCH RBC QN AUTO: 29.3 PG (ref 26–34)
MCHC RBC AUTO-ENTMCNC: 30.9 G/DL (ref 31–37)
MCHC RBC AUTO-ENTMCNC: 30.9 G/DL (ref 31–37)
MCHC RBC AUTO-ENTMCNC: 31.1 G/DL (ref 31–37)
MCHC RBC AUTO-ENTMCNC: 31.2 G/DL (ref 31–37)
MCHC RBC AUTO-ENTMCNC: 31.3 G/DL (ref 31–37)
MCHC RBC AUTO-ENTMCNC: 31.5 G/DL (ref 31–37)
MCHC RBC AUTO-ENTMCNC: 31.7 G/DL (ref 31–37)
MCHC RBC AUTO-ENTMCNC: 31.9 G/DL (ref 31–37)
MCHC RBC AUTO-ENTMCNC: 32.3 G/DL (ref 31–37)
MCHC RBC AUTO-ENTMCNC: 32.4 G/DL (ref 31–37)
MCHC RBC AUTO-ENTMCNC: 32.4 G/DL (ref 31–37)
MCHC RBC AUTO-ENTMCNC: 32.5 G/DL (ref 31–37)
MCHC RBC AUTO-ENTMCNC: 32.5 G/DL (ref 31–37)
MCHC RBC AUTO-ENTMCNC: 32.6 G/DL (ref 31–37)
MCHC RBC AUTO-ENTMCNC: 33.3 G/DL (ref 31–37)
MCHC RBC AUTO-ENTMCNC: 33.3 G/DL (ref 31–37)
MCV RBC AUTO: 82.9 FL
MCV RBC AUTO: 83.1 FL
MCV RBC AUTO: 84.3 FL
MCV RBC AUTO: 84.8 FL
MCV RBC AUTO: 85.1 FL
MCV RBC AUTO: 85.6 FL
MCV RBC AUTO: 85.8 FL
MCV RBC AUTO: 86.4 FL
MCV RBC AUTO: 87.3 FL
MCV RBC AUTO: 87.4 FL
MCV RBC AUTO: 87.7 FL
MCV RBC AUTO: 88 FL
MCV RBC AUTO: 88.8 FL
MCV RBC AUTO: 89.8 FL
MCV RBC AUTO: 92.5 FL
MCV RBC AUTO: 94.7 FL
MEAN PRES AIRWAY: 15 CM H2O
METAPNEUMOVIRUS PCR:: NOT DETECTED
METHGB MFR BLD: 0 % SAT (ref 0.4–1.5)
METHGB MFR BLD: 0 % SAT (ref 0.4–1.5)
METHGB MFR BLD: 0.6 % SAT (ref 0.4–1.5)
METHGB MFR BLD: 0.7 % SAT (ref 0.4–1.5)
METHGB MFR BLD: 0.8 % SAT (ref 0.4–1.5)
METHGB MFR BLD: 0.9 % SAT (ref 0.4–1.5)
METHGB MFR BLD: 0.9 % SAT (ref 0.4–1.5)
METHGB MFR BLD: 1 % SAT (ref 0.4–1.5)
METHGB MFR BLD: 1.1 % SAT (ref 0.4–1.5)
MONOCYTES # BLD AUTO: 0.46 X10(3) UL (ref 0.1–1)
MONOCYTES # BLD AUTO: 0.49 X10(3) UL (ref 0.1–1)
MONOCYTES # BLD AUTO: 0.61 X10(3) UL (ref 0.1–1)
MONOCYTES # BLD AUTO: 0.76 X10(3) UL (ref 0.1–1)
MONOCYTES # BLD AUTO: 0.8 X10(3) UL (ref 0.1–1)
MONOCYTES # BLD AUTO: 1.17 X10(3) UL (ref 0.1–1)
MONOCYTES # BLD AUTO: 1.3 X10(3) UL (ref 0.1–1)
MONOCYTES # BLD AUTO: 1.46 X10(3) UL (ref 0.1–1)
MONOCYTES # BLD AUTO: 1.64 X10(3) UL (ref 0.1–1)
MONOCYTES # BLD AUTO: 1.65 X10(3) UL (ref 0.1–1)
MONOCYTES # BLD: 0.34 X10(3) UL (ref 0.1–1)
MONOCYTES NFR BLD AUTO: 11.2 %
MONOCYTES NFR BLD AUTO: 11.8 %
MONOCYTES NFR BLD AUTO: 12.7 %
MONOCYTES NFR BLD AUTO: 4.6 %
MONOCYTES NFR BLD AUTO: 8.2 %
MONOCYTES NFR BLD AUTO: 8.3 %
MONOCYTES NFR BLD AUTO: 9.3 %
MONOCYTES NFR BLD AUTO: 9.3 %
MONOCYTES NFR BLD AUTO: 9.4 %
MONOCYTES NFR BLD AUTO: 9.9 %
MONOCYTES NFR BLD: 2 %
MORPHOLOGY: NORMAL
MRSA DNA SPEC QL NAA+PROBE: NEGATIVE
MYCOPLASMA PNEUMONIA PCR:: NOT DETECTED
NEUTROPHILS # BLD AUTO: 10.36 X10 (3) UL (ref 1.5–7.7)
NEUTROPHILS # BLD AUTO: 10.36 X10(3) UL (ref 1.5–7.7)
NEUTROPHILS # BLD AUTO: 10.63 X10 (3) UL (ref 1.5–7.7)
NEUTROPHILS # BLD AUTO: 10.63 X10(3) UL (ref 1.5–7.7)
NEUTROPHILS # BLD AUTO: 11.75 X10 (3) UL (ref 1.5–7.7)
NEUTROPHILS # BLD AUTO: 11.75 X10(3) UL (ref 1.5–7.7)
NEUTROPHILS # BLD AUTO: 14.43 X10 (3) UL (ref 1.5–7.7)
NEUTROPHILS # BLD AUTO: 15.32 X10 (3) UL (ref 1.5–7.7)
NEUTROPHILS # BLD AUTO: 15.32 X10(3) UL (ref 1.5–7.7)
NEUTROPHILS # BLD AUTO: 3.95 X10 (3) UL (ref 1.5–7.7)
NEUTROPHILS # BLD AUTO: 3.95 X10(3) UL (ref 1.5–7.7)
NEUTROPHILS # BLD AUTO: 4.17 X10 (3) UL (ref 1.5–7.7)
NEUTROPHILS # BLD AUTO: 4.17 X10(3) UL (ref 1.5–7.7)
NEUTROPHILS # BLD AUTO: 4.95 X10 (3) UL (ref 1.5–7.7)
NEUTROPHILS # BLD AUTO: 4.95 X10(3) UL (ref 1.5–7.7)
NEUTROPHILS # BLD AUTO: 7.24 X10 (3) UL (ref 1.5–7.7)
NEUTROPHILS # BLD AUTO: 7.24 X10(3) UL (ref 1.5–7.7)
NEUTROPHILS # BLD AUTO: 8.07 X10 (3) UL (ref 1.5–7.7)
NEUTROPHILS # BLD AUTO: 8.07 X10(3) UL (ref 1.5–7.7)
NEUTROPHILS # BLD AUTO: 9.53 X10 (3) UL (ref 1.5–7.7)
NEUTROPHILS # BLD AUTO: 9.53 X10(3) UL (ref 1.5–7.7)
NEUTROPHILS NFR BLD AUTO: 70 %
NEUTROPHILS NFR BLD AUTO: 73.7 %
NEUTROPHILS NFR BLD AUTO: 73.8 %
NEUTROPHILS NFR BLD AUTO: 74.7 %
NEUTROPHILS NFR BLD AUTO: 74.9 %
NEUTROPHILS NFR BLD AUTO: 76 %
NEUTROPHILS NFR BLD AUTO: 76.2 %
NEUTROPHILS NFR BLD AUTO: 79.9 %
NEUTROPHILS NFR BLD AUTO: 80.9 %
NEUTROPHILS NFR BLD AUTO: 92.4 %
NEUTROPHILS NFR BLD: 93 %
NEUTS BAND NFR BLD: 2 %
NEUTS HYPERSEG # BLD: 16.06 X10(3) UL (ref 1.5–7.7)
NITRITE UR QL STRIP.AUTO: NEGATIVE
NON GYNE INTERPRETATION: NEGATIVE
NOROVIRUS GI/GII PCR: NEGATIVE
NRBC BLD MANUAL-RTO: 3 %
OSMOLALITY SERPL CALC.SUM OF ELEC: 255 MOSM/KG (ref 275–295)
OSMOLALITY SERPL CALC.SUM OF ELEC: 255 MOSM/KG (ref 275–295)
OSMOLALITY SERPL CALC.SUM OF ELEC: 256 MOSM/KG (ref 275–295)
OSMOLALITY SERPL CALC.SUM OF ELEC: 259 MOSM/KG (ref 275–295)
OSMOLALITY SERPL CALC.SUM OF ELEC: 273 MOSM/KG (ref 275–295)
OSMOLALITY SERPL CALC.SUM OF ELEC: 280 MOSM/KG (ref 275–295)
OSMOLALITY SERPL CALC.SUM OF ELEC: 284 MOSM/KG (ref 275–295)
OSMOLALITY SERPL CALC.SUM OF ELEC: 294 MOSM/KG (ref 275–295)
OSMOLALITY SERPL CALC.SUM OF ELEC: 298 MOSM/KG (ref 275–295)
OSMOLALITY SERPL CALC.SUM OF ELEC: 301 MOSM/KG (ref 275–295)
OSMOLALITY SERPL CALC.SUM OF ELEC: 305 MOSM/KG (ref 275–295)
OSMOLALITY SERPL CALC.SUM OF ELEC: 305 MOSM/KG (ref 275–295)
OSMOLALITY SERPL CALC.SUM OF ELEC: 307 MOSM/KG (ref 275–295)
OSMOLALITY SERPL CALC.SUM OF ELEC: 307 MOSM/KG (ref 275–295)
OSMOLALITY SERPL CALC.SUM OF ELEC: 309 MOSM/KG (ref 275–295)
OSMOLALITY SERPL CALC.SUM OF ELEC: 311 MOSM/KG (ref 275–295)
OSMOLALITY SERPL CALC.SUM OF ELEC: 312 MOSM/KG (ref 275–295)
OSMOLALITY SERPL CALC.SUM OF ELEC: 318 MOSM/KG (ref 275–295)
OSMOLALITY SERPL CALC.SUM OF ELEC: 326 MOSM/KG (ref 275–295)
OSMOLALITY SERPL CALC.SUM OF ELEC: 331 MOSM/KG (ref 275–295)
OSMOLALITY SERPL CALC.SUM OF ELEC: 335 MOSM/KG (ref 275–295)
OSMOLALITY SERPL CALC.SUM OF ELEC: 340 MOSM/KG (ref 275–295)
OSMOLALITY UR: 567 MOSM/KG (ref 300–1300)
OSMOLALITY UR: 604 MOSM/KG (ref 300–1300)
OXYHGB MFR BLDA: 96.1 % (ref 92–100)
OXYHGB MFR BLDA: 96.1 % (ref 92–100)
OXYHGB MFR BLDA: 96.3 % (ref 92–100)
OXYHGB MFR BLDA: 96.6 % (ref 92–100)
OXYHGB MFR BLDA: 96.8 % (ref 92–100)
OXYHGB MFR BLDA: 97.2 % (ref 92–100)
OXYHGB MFR BLDA: 97.3 % (ref 92–100)
OXYHGB MFR BLDA: 97.3 % (ref 92–100)
OXYHGB MFR BLDA: 97.5 % (ref 92–100)
OXYHGB MFR BLDA: 97.5 % (ref 92–100)
OXYHGB MFR BLDA: 97.6 % (ref 92–100)
OXYHGB MFR BLDA: 98.3 % (ref 92–100)
P AXIS: 36 DEGREES
P SHIGELLOIDES DNA STL QL NAA+PROBE: NEGATIVE
P-R INTERVAL: 140 MS
P. JIROVECI QPCR BAL: NOT DETECTED COPIES/ML
P/F RATIO: 188 MMHG
P/F RATIO: 192 MMHG
P/F RATIO: 197 MMHG
P/F RATIO: 218 MMHG
P/F RATIO: 230 MMHG
P/F RATIO: 235 MMHG
PARAINFLUENZA 1 PCR:: NOT DETECTED
PARAINFLUENZA 2 PCR:: NOT DETECTED
PARAINFLUENZA 3 PCR:: NOT DETECTED
PARAINFLUENZA 4 PCR:: NOT DETECTED
PAW @ PEAK INSP FLOW SETTING VENT: 25 CM H2O
PAW @ PEAK INSP FLOW SETTING VENT: 30 CM H2O
PCO2 BLDA: 24 MM HG (ref 35–45)
PCO2 BLDA: 25 MM HG (ref 35–45)
PCO2 BLDA: 25 MM HG (ref 35–45)
PCO2 BLDA: 26 MM HG (ref 35–45)
PCO2 BLDA: 27 MM HG (ref 35–45)
PCO2 BLDA: 28 MM HG (ref 35–45)
PCO2 BLDA: 29 MM HG (ref 35–45)
PCO2 BLDA: 31 MM HG (ref 35–45)
PCO2 BLDA: 33 MM HG (ref 35–45)
PCO2 BLDA: 35 MM HG (ref 35–45)
PCO2 BLDA: 40 MM HG (ref 35–45)
PCO2 BLDA: 42 MM HG (ref 35–45)
PCO2 BLDA: 44 MM HG (ref 35–45)
PEEP: 5 CM H2O
PH BLDA: 7.2 [PH] (ref 7.35–7.45)
PH BLDA: 7.27 [PH] (ref 7.35–7.45)
PH BLDA: 7.29 [PH] (ref 7.35–7.45)
PH BLDA: 7.29 [PH] (ref 7.35–7.45)
PH BLDA: 7.3 [PH] (ref 7.35–7.45)
PH BLDA: 7.3 [PH] (ref 7.35–7.45)
PH BLDA: 7.38 [PH] (ref 7.35–7.45)
PH BLDA: 7.4 [PH] (ref 7.35–7.45)
PH BLDA: 7.43 [PH] (ref 7.35–7.45)
PH BLDA: 7.44 [PH] (ref 7.35–7.45)
PH BLDA: 7.45 [PH] (ref 7.35–7.45)
PH BLDA: 7.47 [PH] (ref 7.35–7.45)
PH BLDA: 7.54 [PH] (ref 7.35–7.45)
PH UR STRIP.AUTO: 6 [PH] (ref 5–8)
PHOSPHATE SERPL-MCNC: 2.3 MG/DL (ref 2.5–4.9)
PHOSPHATE SERPL-MCNC: 2.5 MG/DL (ref 2.5–4.9)
PHOSPHATE SERPL-MCNC: 2.6 MG/DL (ref 2.5–4.9)
PHOSPHATE SERPL-MCNC: 2.7 MG/DL (ref 2.5–4.9)
PHOSPHATE SERPL-MCNC: 3 MG/DL (ref 2.5–4.9)
PHOSPHATE SERPL-MCNC: 3.5 MG/DL (ref 2.5–4.9)
PHOSPHATE SERPL-MCNC: 3.6 MG/DL (ref 2.5–4.9)
PHOSPHATE SERPL-MCNC: 3.8 MG/DL (ref 2.5–4.9)
PHOSPHATE SERPL-MCNC: 4 MG/DL (ref 2.5–4.9)
PHOSPHATE SERPL-MCNC: 4.1 MG/DL (ref 2.5–4.9)
PHOSPHATE SERPL-MCNC: 4.4 MG/DL (ref 2.5–4.9)
PHOSPHATE SERPL-MCNC: 4.8 MG/DL (ref 2.5–4.9)
PHOSPHATE SERPL-MCNC: 4.9 MG/DL (ref 2.5–4.9)
PHOSPHATE SERPL-MCNC: 5.3 MG/DL (ref 2.5–4.9)
PHOSPHATE SERPL-MCNC: 5.3 MG/DL (ref 2.5–4.9)
PHOSPHATE SERPL-MCNC: 5.7 MG/DL (ref 2.5–4.9)
PHOSPHATE SERPL-MCNC: 5.9 MG/DL (ref 2.5–4.9)
PLATELET # BLD AUTO: 261 10(3)UL (ref 150–450)
PLATELET # BLD AUTO: 277 10(3)UL (ref 150–450)
PLATELET # BLD AUTO: 304 10(3)UL (ref 150–450)
PLATELET # BLD AUTO: 309 10(3)UL (ref 150–450)
PLATELET # BLD AUTO: 340 10(3)UL (ref 150–450)
PLATELET # BLD AUTO: 342 10(3)UL (ref 150–450)
PLATELET # BLD AUTO: 343 10(3)UL (ref 150–450)
PLATELET # BLD AUTO: 353 10(3)UL (ref 150–450)
PLATELET # BLD AUTO: 367 10(3)UL (ref 150–450)
PLATELET # BLD AUTO: 391 10(3)UL (ref 150–450)
PLATELET # BLD AUTO: 407 10(3)UL (ref 150–450)
PLATELET # BLD AUTO: 424 10(3)UL (ref 150–450)
PLATELET # BLD AUTO: 426 10(3)UL (ref 150–450)
PLATELET # BLD AUTO: 431 10(3)UL (ref 150–450)
PLATELET # BLD AUTO: 445 10(3)UL (ref 150–450)
PLATELET # BLD AUTO: 472 10(3)UL (ref 150–450)
PLATELET MORPHOLOGY: NORMAL
PO2 BLDA: 103 MM HG (ref 80–100)
PO2 BLDA: 109 MM HG (ref 80–100)
PO2 BLDA: 118 MM HG (ref 80–100)
PO2 BLDA: 123 MM HG (ref 80–100)
PO2 BLDA: 123 MM HG (ref 80–100)
PO2 BLDA: 138 MM HG (ref 80–100)
PO2 BLDA: 185 MM HG (ref 80–100)
PO2 BLDA: 78 MM HG (ref 80–100)
PO2 BLDA: 80 MM HG (ref 80–100)
PO2 BLDA: 89 MM HG (ref 80–100)
PO2 BLDA: 94 MM HG (ref 80–100)
PO2 BLDA: 98 MM HG (ref 80–100)
PO2 BLDA: 99 MM HG (ref 80–100)
POTASSIUM BLD-SCNC: 4.2 MMOL/L (ref 3.6–5.1)
POTASSIUM BLD-SCNC: 4.3 MMOL/L (ref 3.6–5.1)
POTASSIUM BLD-SCNC: 4.5 MMOL/L (ref 3.6–5.1)
POTASSIUM BLD-SCNC: 4.5 MMOL/L (ref 3.6–5.1)
POTASSIUM BLD-SCNC: 4.9 MMOL/L (ref 3.6–5.1)
POTASSIUM BLD-SCNC: 5 MMOL/L (ref 3.6–5.1)
POTASSIUM BLD-SCNC: 5.2 MMOL/L (ref 3.6–5.1)
POTASSIUM BLD-SCNC: 5.2 MMOL/L (ref 3.6–5.1)
POTASSIUM BLD-SCNC: 5.3 MMOL/L (ref 3.6–5.1)
POTASSIUM BLD-SCNC: 5.7 MMOL/L (ref 3.6–5.1)
POTASSIUM BLD-SCNC: 5.8 MMOL/L (ref 3.6–5.1)
POTASSIUM BLD-SCNC: 6.4 MMOL/L (ref 3.6–5.1)
POTASSIUM SERPL-SCNC: 3.5 MMOL/L (ref 3.5–5.1)
POTASSIUM SERPL-SCNC: 3.7 MMOL/L (ref 3.5–5.1)
POTASSIUM SERPL-SCNC: 3.7 MMOL/L (ref 3.5–5.1)
POTASSIUM SERPL-SCNC: 3.9 MMOL/L (ref 3.5–5.1)
POTASSIUM SERPL-SCNC: 3.9 MMOL/L (ref 3.5–5.1)
POTASSIUM SERPL-SCNC: 4 MMOL/L (ref 3.5–5.1)
POTASSIUM SERPL-SCNC: 4.1 MMOL/L (ref 3.5–5.1)
POTASSIUM SERPL-SCNC: 4.2 MMOL/L (ref 3.5–5.1)
POTASSIUM SERPL-SCNC: 4.2 MMOL/L (ref 3.5–5.1)
POTASSIUM SERPL-SCNC: 4.3 MMOL/L (ref 3.5–5.1)
POTASSIUM SERPL-SCNC: 4.4 MMOL/L (ref 3.5–5.1)
POTASSIUM SERPL-SCNC: 4.4 MMOL/L (ref 3.5–5.1)
POTASSIUM SERPL-SCNC: 4.6 MMOL/L (ref 3.5–5.1)
POTASSIUM SERPL-SCNC: 4.7 MMOL/L (ref 3.5–5.1)
POTASSIUM SERPL-SCNC: 4.7 MMOL/L (ref 3.5–5.1)
POTASSIUM SERPL-SCNC: 4.8 MMOL/L (ref 3.5–5.1)
POTASSIUM SERPL-SCNC: 4.9 MMOL/L (ref 3.5–5.1)
POTASSIUM SERPL-SCNC: 4.9 MMOL/L (ref 3.5–5.1)
POTASSIUM SERPL-SCNC: 5 MMOL/L (ref 3.5–5.1)
POTASSIUM SERPL-SCNC: 5.1 MMOL/L (ref 3.5–5.1)
POTASSIUM SERPL-SCNC: 5.2 MMOL/L (ref 3.5–5.1)
POTASSIUM SERPL-SCNC: 5.4 MMOL/L (ref 3.5–5.1)
POTASSIUM SERPL-SCNC: 5.4 MMOL/L (ref 3.5–5.1)
PROT SERPL-MCNC: 6.7 G/DL (ref 6.4–8.2)
PROT SERPL-MCNC: 6.8 G/DL (ref 6.4–8.2)
PROT SERPL-MCNC: 6.8 G/DL (ref 6.4–8.2)
PROT SERPL-MCNC: 7 G/DL (ref 6.4–8.2)
PROT SERPL-MCNC: 7 G/DL (ref 6.4–8.2)
PROT SERPL-MCNC: 8 G/DL (ref 6.4–8.2)
PROT UR STRIP.AUTO-MCNC: 30 MG/DL
PROTHROMBIN TIME: 18.7 SECONDS (ref 11.6–14.8)
Q-T INTERVAL: 272 MS
Q-T INTERVAL: 292 MS
Q-T INTERVAL: 292 MS
QRS DURATION: 58 MS
QRS DURATION: 72 MS
QRS DURATION: 84 MS
QTC CALCULATION (BEZET): 397 MS
QTC CALCULATION (BEZET): 409 MS
QTC CALCULATION (BEZET): 431 MS
R AXIS: -14 DEGREES
R AXIS: 10 DEGREES
R AXIS: 23 DEGREES
RBC # BLD AUTO: 2.46 X10(6)UL
RBC # BLD AUTO: 2.5 X10(6)UL
RBC # BLD AUTO: 2.54 X10(6)UL
RBC # BLD AUTO: 2.55 X10(6)UL
RBC # BLD AUTO: 2.56 X10(6)UL
RBC # BLD AUTO: 2.64 X10(6)UL
RBC # BLD AUTO: 2.67 X10(6)UL
RBC # BLD AUTO: 2.67 X10(6)UL
RBC # BLD AUTO: 2.68 X10(6)UL
RBC # BLD AUTO: 2.68 X10(6)UL
RBC # BLD AUTO: 2.69 X10(6)UL
RBC # BLD AUTO: 2.74 X10(6)UL
RBC # BLD AUTO: 2.83 X10(6)UL
RBC # BLD AUTO: 2.93 X10(6)UL
RBC # BLD AUTO: 3.12 X10(6)UL
RBC # BLD AUTO: 3.5 X10(6)UL
RBC UR QL AUTO: NEGATIVE
RETICS # AUTO: 36.5 X10(3) UL (ref 22.5–147.5)
RETICS/RBC NFR AUTO: 1.2 %
RH BLOOD TYPE: POSITIVE
RH BLOOD TYPE: POSITIVE
RHINOVIRUS/ENTERO PCR:: NOT DETECTED
ROTAVIRUS A PCR: NEGATIVE
RSV RNA SPEC QL NAA+PROBE: NOT DETECTED
SALMONELLA DNA SPEC QL NAA+PROBE: NEGATIVE
SAPOVIRUS PCR: NEGATIVE
SARS-COV-2 RNA NPH QL NAA+NON-PROBE: NOT DETECTED
SHIGELLA SP+EIEC IPAH ST NAA+NON-PROBE: NEGATIVE
SODIUM BLD-SCNC: 124 MMOL/L (ref 135–145)
SODIUM BLD-SCNC: 126 MMOL/L (ref 135–145)
SODIUM BLD-SCNC: 127 MMOL/L (ref 135–145)
SODIUM BLD-SCNC: 129 MMOL/L (ref 135–145)
SODIUM BLD-SCNC: 129 MMOL/L (ref 135–145)
SODIUM BLD-SCNC: 131 MMOL/L (ref 135–145)
SODIUM BLD-SCNC: 131 MMOL/L (ref 135–145)
SODIUM BLD-SCNC: 132 MMOL/L (ref 135–145)
SODIUM BLD-SCNC: 132 MMOL/L (ref 135–145)
SODIUM BLD-SCNC: 134 MMOL/L (ref 135–145)
SODIUM BLD-SCNC: 135 MMOL/L (ref 135–145)
SODIUM BLD-SCNC: 138 MMOL/L (ref 135–145)
SODIUM SERPL-SCNC: 120 MMOL/L (ref 136–145)
SODIUM SERPL-SCNC: 121 MMOL/L (ref 136–145)
SODIUM SERPL-SCNC: 122 MMOL/L (ref 136–145)
SODIUM SERPL-SCNC: 122 MMOL/L (ref 136–145)
SODIUM SERPL-SCNC: 123 MMOL/L (ref 136–145)
SODIUM SERPL-SCNC: 128 MMOL/L (ref 136–145)
SODIUM SERPL-SCNC: 129 MMOL/L (ref 136–145)
SODIUM SERPL-SCNC: 131 MMOL/L (ref 136–145)
SODIUM SERPL-SCNC: 132 MMOL/L (ref 136–145)
SODIUM SERPL-SCNC: 134 MMOL/L (ref 136–145)
SODIUM SERPL-SCNC: 135 MMOL/L (ref 136–145)
SODIUM SERPL-SCNC: 137 MMOL/L (ref 136–145)
SODIUM SERPL-SCNC: 137 MMOL/L (ref 136–145)
SODIUM SERPL-SCNC: 139 MMOL/L (ref 136–145)
SODIUM SERPL-SCNC: 141 MMOL/L (ref 136–145)
SODIUM SERPL-SCNC: 141 MMOL/L (ref 136–145)
SODIUM SERPL-SCNC: 142 MMOL/L (ref 136–145)
SODIUM SERPL-SCNC: 25 MMOL/L
SODIUM SERPL-SCNC: <5 MMOL/L
SODIUM SERPL-SCNC: <5 MMOL/L
SP GR UR STRIP.AUTO: >1.03 (ref 1–1.03)
STREP PNEUMO ANTIGEN, URINE: NEGATIVE
T AXIS: -6 DEGREES
T AXIS: -6 DEGREES
T AXIS: 15 DEGREES
TIBC SERPL-MCNC: 207 UG/DL (ref 240–450)
TIDAL VOLUME: 400 ML
TIDAL VOLUME: 500 ML
TOTAL CELLS COUNTED BLD: 100
TRANSFERRIN SERPL-MCNC: 139 MG/DL (ref 200–360)
TRIGL SERPL-MCNC: 115 MG/DL (ref 30–149)
TRIGL SERPL-MCNC: 119 MG/DL (ref 30–149)
TRIGL SERPL-MCNC: 149 MG/DL (ref 30–149)
TSI SER-ACNC: 2.45 MIU/ML (ref 0.36–3.74)
UNIT VOLUME: 350 ML
UNIT VOLUME: 350 ML
UR GALACT AG SCR: <0.5
URATE SERPL-MCNC: 4.5 MG/DL
UROBILINOGEN UR STRIP.AUTO-MCNC: <2 MG/DL
V CHOLERAE DNA SPEC QL NAA+PROBE: NEGATIVE
VENT RATE: 18 /MIN
VENT RATE: 20 /MIN
VENT RATE: 24 /MIN
VENTRICULAR RATE: 118 BPM
VENTRICULAR RATE: 128 BPM
VENTRICULAR RATE: 131 BPM
VIBRIO DNA SPEC NAA+PROBE: NEGATIVE
WBC # BLD AUTO: 10.8 X10(3) UL (ref 4–11)
WBC # BLD AUTO: 11.5 X10(3) UL (ref 4–11)
WBC # BLD AUTO: 11.8 X10(3) UL (ref 4–11)
WBC # BLD AUTO: 13.9 X10(3) UL (ref 4–11)
WBC # BLD AUTO: 14.1 X10(3) UL (ref 4–11)
WBC # BLD AUTO: 14.7 X10(3) UL (ref 4–11)
WBC # BLD AUTO: 15.4 X10(3) UL (ref 4–11)
WBC # BLD AUTO: 16.2 X10(3) UL (ref 4–11)
WBC # BLD AUTO: 16.6 X10(3) UL (ref 4–11)
WBC # BLD AUTO: 16.9 X10(3) UL (ref 4–11)
WBC # BLD AUTO: 5.3 X10(3) UL (ref 4–11)
WBC # BLD AUTO: 5.6 X10(3) UL (ref 4–11)
WBC # BLD AUTO: 6.5 X10(3) UL (ref 4–11)
WBC # BLD AUTO: 9.7 X10(3) UL (ref 4–11)
YERSINIA DNA SPEC NAA+PROBE: NEGATIVE

## 2023-01-01 PROCEDURE — 76775 US EXAM ABDO BACK WALL LIM: CPT | Performed by: INTERNAL MEDICINE

## 2023-01-01 PROCEDURE — 99232 SBSQ HOSP IP/OBS MODERATE 35: CPT | Performed by: NURSE PRACTITIONER

## 2023-01-01 PROCEDURE — 71045 X-RAY EXAM CHEST 1 VIEW: CPT | Performed by: NURSE PRACTITIONER

## 2023-01-01 PROCEDURE — 0DJ08ZZ INSPECTION OF UPPER INTESTINAL TRACT, VIA NATURAL OR ARTIFICIAL OPENING ENDOSCOPIC: ICD-10-PCS | Performed by: INTERNAL MEDICINE

## 2023-01-01 PROCEDURE — 3E043XZ INTRODUCTION OF VASOPRESSOR INTO CENTRAL VEIN, PERCUTANEOUS APPROACH: ICD-10-PCS | Performed by: HOSPITALIST

## 2023-01-01 PROCEDURE — 99418 PROLNG IP/OBS E/M EA 15 MIN: CPT | Performed by: NURSE PRACTITIONER

## 2023-01-01 PROCEDURE — 74176 CT ABD & PELVIS W/O CONTRAST: CPT | Performed by: INTERNAL MEDICINE

## 2023-01-01 PROCEDURE — 5A1955Z RESPIRATORY VENTILATION, GREATER THAN 96 CONSECUTIVE HOURS: ICD-10-PCS | Performed by: HOSPITALIST

## 2023-01-01 PROCEDURE — 30233N1 TRANSFUSION OF NONAUTOLOGOUS RED BLOOD CELLS INTO PERIPHERAL VEIN, PERCUTANEOUS APPROACH: ICD-10-PCS | Performed by: HOSPITALIST

## 2023-01-01 PROCEDURE — 99291 CRITICAL CARE FIRST HOUR: CPT | Performed by: INTERNAL MEDICINE

## 2023-01-01 PROCEDURE — 99291 CRITICAL CARE FIRST HOUR: CPT | Performed by: STUDENT IN AN ORGANIZED HEALTH CARE EDUCATION/TRAINING PROGRAM

## 2023-01-01 PROCEDURE — 02HV33Z INSERTION OF INFUSION DEVICE INTO SUPERIOR VENA CAVA, PERCUTANEOUS APPROACH: ICD-10-PCS | Performed by: HOSPITALIST

## 2023-01-01 PROCEDURE — 71045 X-RAY EXAM CHEST 1 VIEW: CPT | Performed by: EMERGENCY MEDICINE

## 2023-01-01 PROCEDURE — 31624 DX BRONCHOSCOPE/LAVAGE: CPT | Performed by: INTERNAL MEDICINE

## 2023-01-01 PROCEDURE — 99497 ADVNCD CARE PLAN 30 MIN: CPT | Performed by: NURSE PRACTITIONER

## 2023-01-01 PROCEDURE — 71275 CT ANGIOGRAPHY CHEST: CPT | Performed by: EMERGENCY MEDICINE

## 2023-01-01 PROCEDURE — 71045 X-RAY EXAM CHEST 1 VIEW: CPT

## 2023-01-01 PROCEDURE — 99233 SBSQ HOSP IP/OBS HIGH 50: CPT | Performed by: NURSE PRACTITIONER

## 2023-01-01 PROCEDURE — 99223 1ST HOSP IP/OBS HIGH 75: CPT | Performed by: NURSE PRACTITIONER

## 2023-01-01 PROCEDURE — 0B9D8ZX DRAINAGE OF RIGHT MIDDLE LUNG LOBE, VIA NATURAL OR ARTIFICIAL OPENING ENDOSCOPIC, DIAGNOSTIC: ICD-10-PCS | Performed by: INTERNAL MEDICINE

## 2023-01-01 PROCEDURE — 71045 X-RAY EXAM CHEST 1 VIEW: CPT | Performed by: INTERNAL MEDICINE

## 2023-01-01 PROCEDURE — 0BH17EZ INSERTION OF ENDOTRACHEAL AIRWAY INTO TRACHEA, VIA NATURAL OR ARTIFICIAL OPENING: ICD-10-PCS | Performed by: INTERNAL MEDICINE

## 2023-01-01 PROCEDURE — 93306 TTE W/DOPPLER COMPLETE: CPT | Performed by: INTERNAL MEDICINE

## 2023-01-01 PROCEDURE — 31500 INSERT EMERGENCY AIRWAY: CPT | Performed by: INTERNAL MEDICINE

## 2023-01-01 PROCEDURE — 74018 RADEX ABDOMEN 1 VIEW: CPT | Performed by: INTERNAL MEDICINE

## 2023-01-01 PROCEDURE — 71250 CT THORAX DX C-: CPT | Performed by: INTERNAL MEDICINE

## 2023-01-01 PROCEDURE — 36573 INSJ PICC RS&I 5 YR+: CPT | Performed by: NURSE PRACTITIONER

## 2023-01-01 RX ORDER — PREDNISONE 20 MG/1
40 TABLET ORAL
Status: DISCONTINUED | OUTPATIENT
Start: 2023-01-01 | End: 2023-01-01

## 2023-01-01 RX ORDER — DILTIAZEM HYDROCHLORIDE 60 MG/1
60 TABLET, FILM COATED ORAL EVERY 6 HOURS SCHEDULED
Status: DISCONTINUED | OUTPATIENT
Start: 2023-01-01 | End: 2023-01-01

## 2023-01-01 RX ORDER — HEPARIN SODIUM 5000 [USP'U]/ML
5000 INJECTION, SOLUTION INTRAVENOUS; SUBCUTANEOUS EVERY 8 HOURS SCHEDULED
Status: DISCONTINUED | OUTPATIENT
Start: 2023-01-01 | End: 2023-01-01

## 2023-01-01 RX ORDER — ATOVAQUONE 750 MG/5ML
750 SUSPENSION ORAL 2 TIMES DAILY WITH MEALS
Status: DISCONTINUED | OUTPATIENT
Start: 2023-01-01 | End: 2023-01-01

## 2023-01-01 RX ORDER — LEVOFLOXACIN 5 MG/ML
750 INJECTION, SOLUTION INTRAVENOUS EVERY 24 HOURS
Status: DISCONTINUED | OUTPATIENT
Start: 2023-01-01 | End: 2023-01-01

## 2023-01-01 RX ORDER — METOPROLOL TARTRATE 5 MG/5ML
5 INJECTION INTRAVENOUS EVERY 4 HOURS PRN
Status: DISCONTINUED | OUTPATIENT
Start: 2023-01-01 | End: 2023-01-01

## 2023-01-01 RX ORDER — LABETALOL HYDROCHLORIDE 5 MG/ML
5 INJECTION, SOLUTION INTRAVENOUS EVERY 5 MIN PRN
Status: ACTIVE | OUTPATIENT
Start: 2023-01-01 | End: 2023-01-01

## 2023-01-01 RX ORDER — SODIUM CHLORIDE, SODIUM LACTATE, POTASSIUM CHLORIDE, CALCIUM CHLORIDE 600; 310; 30; 20 MG/100ML; MG/100ML; MG/100ML; MG/100ML
INJECTION, SOLUTION INTRAVENOUS CONTINUOUS
Status: DISCONTINUED | OUTPATIENT
Start: 2023-01-01 | End: 2023-01-01

## 2023-01-01 RX ORDER — SODIUM BICARBONATE 325 MG/1
325 TABLET ORAL AS NEEDED
Status: DISCONTINUED | OUTPATIENT
Start: 2023-01-01 | End: 2023-01-01

## 2023-01-01 RX ORDER — ALBUMIN (HUMAN) 12.5 G/50ML
25 SOLUTION INTRAVENOUS 3 TIMES DAILY
Status: DISCONTINUED | OUTPATIENT
Start: 2023-01-01 | End: 2023-01-01

## 2023-01-01 RX ORDER — DILTIAZEM HYDROCHLORIDE 180 MG/1
360 CAPSULE, EXTENDED RELEASE ORAL DAILY
Status: DISCONTINUED | OUTPATIENT
Start: 2023-01-01 | End: 2023-01-01

## 2023-01-01 RX ORDER — NALOXONE HYDROCHLORIDE 0.4 MG/ML
80 INJECTION, SOLUTION INTRAMUSCULAR; INTRAVENOUS; SUBCUTANEOUS AS NEEDED
Status: ACTIVE | OUTPATIENT
Start: 2023-01-01 | End: 2023-01-01

## 2023-01-01 RX ORDER — SODIUM CHLORIDE 1 G/1
1 TABLET ORAL
Status: DISCONTINUED | OUTPATIENT
Start: 2023-01-01 | End: 2023-01-01

## 2023-01-01 RX ORDER — ENOXAPARIN SODIUM 100 MG/ML
40 INJECTION SUBCUTANEOUS DAILY
Status: DISCONTINUED | OUTPATIENT
Start: 2023-01-01 | End: 2023-01-01

## 2023-01-01 RX ORDER — HALOPERIDOL 5 MG/ML
2 INJECTION INTRAMUSCULAR
Status: DISCONTINUED | OUTPATIENT
Start: 2023-01-01 | End: 2023-01-01

## 2023-01-01 RX ORDER — SCOLOPAMINE TRANSDERMAL SYSTEM 1 MG/1
1 PATCH, EXTENDED RELEASE TRANSDERMAL
Status: DISCONTINUED | OUTPATIENT
Start: 2023-01-01 | End: 2023-01-01

## 2023-01-01 RX ORDER — HYDROMORPHONE HYDROCHLORIDE 1 MG/ML
1 INJECTION, SOLUTION INTRAMUSCULAR; INTRAVENOUS; SUBCUTANEOUS ONCE
Status: COMPLETED | OUTPATIENT
Start: 2023-01-01 | End: 2023-01-01

## 2023-01-01 RX ORDER — BUMETANIDE 0.25 MG/ML
1 INJECTION INTRAMUSCULAR; INTRAVENOUS
Status: DISCONTINUED | OUTPATIENT
Start: 2023-01-01 | End: 2023-01-01

## 2023-01-01 RX ORDER — PROCHLORPERAZINE EDISYLATE 5 MG/ML
5 INJECTION INTRAMUSCULAR; INTRAVENOUS EVERY 8 HOURS PRN
Status: DISCONTINUED | OUTPATIENT
Start: 2023-01-01 | End: 2023-01-01

## 2023-01-01 RX ORDER — MIDAZOLAM HYDROCHLORIDE 1 MG/ML
2 INJECTION INTRAMUSCULAR; INTRAVENOUS EVERY 5 MIN PRN
Status: DISCONTINUED | OUTPATIENT
Start: 2023-01-01 | End: 2023-01-01

## 2023-01-01 RX ORDER — SODIUM CHLORIDE 9 MG/ML
INJECTION, SOLUTION INTRAVENOUS ONCE
Status: COMPLETED | OUTPATIENT
Start: 2023-01-01 | End: 2023-01-01

## 2023-01-01 RX ORDER — DOCUSATE SODIUM 100 MG/1
100 CAPSULE, LIQUID FILLED ORAL 2 TIMES DAILY
Status: DISCONTINUED | OUTPATIENT
Start: 2023-01-01 | End: 2023-01-01

## 2023-01-01 RX ORDER — ALBUMIN (HUMAN) 12.5 G/50ML
25 SOLUTION INTRAVENOUS EVERY 12 HOURS
Status: COMPLETED | OUTPATIENT
Start: 2023-01-01 | End: 2023-01-01

## 2023-01-01 RX ORDER — SODIUM CHLORIDE 1 G/1
1 TABLET ORAL 2 TIMES DAILY WITH MEALS
Status: DISCONTINUED | OUTPATIENT
Start: 2023-01-01 | End: 2023-01-01

## 2023-01-01 RX ORDER — ENEMA 19; 7 G/133ML; G/133ML
1 ENEMA RECTAL ONCE AS NEEDED
Status: DISCONTINUED | OUTPATIENT
Start: 2023-01-01 | End: 2023-01-01

## 2023-01-01 RX ORDER — MORPHINE SULFATE 2 MG/ML
2 INJECTION, SOLUTION INTRAMUSCULAR; INTRAVENOUS ONCE
Status: DISCONTINUED | OUTPATIENT
Start: 2023-01-01 | End: 2023-01-01

## 2023-01-01 RX ORDER — ALPRAZOLAM 0.25 MG/1
0.25 TABLET ORAL EVERY 6 HOURS PRN
Status: DISCONTINUED | OUTPATIENT
Start: 2023-01-01 | End: 2023-01-01

## 2023-01-01 RX ORDER — METHYLPREDNISOLONE SODIUM SUCCINATE 125 MG/2ML
60 INJECTION, POWDER, LYOPHILIZED, FOR SOLUTION INTRAMUSCULAR; INTRAVENOUS EVERY 6 HOURS
Status: DISCONTINUED | OUTPATIENT
Start: 2023-01-01 | End: 2023-01-01

## 2023-01-01 RX ORDER — MAGNESIUM OXIDE 400 MG/1
400 TABLET ORAL ONCE
Status: DISCONTINUED | OUTPATIENT
Start: 2023-01-01 | End: 2023-01-01

## 2023-01-01 RX ORDER — ACETAMINOPHEN 500 MG
500 TABLET ORAL EVERY 4 HOURS PRN
Status: DISCONTINUED | OUTPATIENT
Start: 2023-01-01 | End: 2023-01-01

## 2023-01-01 RX ORDER — ACETAMINOPHEN 160 MG/5ML
650 SOLUTION ORAL EVERY 4 HOURS PRN
Status: DISCONTINUED | OUTPATIENT
Start: 2023-01-01 | End: 2023-01-01

## 2023-01-01 RX ORDER — POLYETHYLENE GLYCOL 3350 17 G/17G
17 POWDER, FOR SOLUTION ORAL DAILY
Status: DISCONTINUED | OUTPATIENT
Start: 2023-01-01 | End: 2023-01-01

## 2023-01-01 RX ORDER — MULTIPLE VITAMINS W/ MINERALS TAB 9MG-400MCG
1 TAB ORAL DAILY
Status: DISCONTINUED | OUTPATIENT
Start: 2023-01-01 | End: 2023-01-01

## 2023-01-01 RX ORDER — NICOTINE POLACRILEX 4 MG
15 LOZENGE BUCCAL
Status: DISCONTINUED | OUTPATIENT
Start: 2023-01-01 | End: 2023-01-01

## 2023-01-01 RX ORDER — MORPHINE SULFATE 10 MG/5ML
5 SOLUTION ORAL EVERY 2 HOUR PRN
Status: DISCONTINUED | OUTPATIENT
Start: 2023-01-01 | End: 2023-01-01

## 2023-01-01 RX ORDER — DILTIAZEM HYDROCHLORIDE 5 MG/ML
10 INJECTION INTRAVENOUS EVERY 6 HOURS PRN
Status: DISCONTINUED | OUTPATIENT
Start: 2023-01-01 | End: 2023-01-01

## 2023-01-01 RX ORDER — ACETAMINOPHEN 650 MG/1
650 SUPPOSITORY RECTAL EVERY 6 HOURS PRN
Status: DISCONTINUED | OUTPATIENT
Start: 2023-01-01 | End: 2023-01-01

## 2023-01-01 RX ORDER — ALPRAZOLAM 0.25 MG/1
0.25 TABLET ORAL EVERY 6 HOURS PRN
COMMUNITY

## 2023-01-01 RX ORDER — SODIUM CHLORIDE 9 MG/ML
INJECTION, SOLUTION INTRAVENOUS CONTINUOUS
Status: DISCONTINUED | OUTPATIENT
Start: 2023-01-01 | End: 2023-01-01

## 2023-01-01 RX ORDER — CHLORHEXIDINE GLUCONATE 0.12 MG/ML
15 RINSE ORAL
Status: DISCONTINUED | OUTPATIENT
Start: 2023-01-01 | End: 2023-01-01

## 2023-01-01 RX ORDER — ACETAMINOPHEN 650 MG/1
650 SUPPOSITORY RECTAL EVERY 4 HOURS PRN
Status: DISCONTINUED | OUTPATIENT
Start: 2023-01-01 | End: 2023-01-01

## 2023-01-01 RX ORDER — DEXMEDETOMIDINE HYDROCHLORIDE 4 UG/ML
INJECTION, SOLUTION INTRAVENOUS CONTINUOUS
Status: DISCONTINUED | OUTPATIENT
Start: 2023-01-01 | End: 2023-01-01

## 2023-01-01 RX ORDER — SODIUM CHLORIDE 1 G/1
2 TABLET ORAL
Status: DISCONTINUED | OUTPATIENT
Start: 2023-01-01 | End: 2023-01-01

## 2023-01-01 RX ORDER — METHYLPREDNISOLONE SODIUM SUCCINATE 125 MG/2ML
125 INJECTION, POWDER, LYOPHILIZED, FOR SOLUTION INTRAMUSCULAR; INTRAVENOUS EVERY 6 HOURS
Status: DISCONTINUED | OUTPATIENT
Start: 2023-01-01 | End: 2023-01-01

## 2023-01-01 RX ORDER — IPRATROPIUM BROMIDE AND ALBUTEROL SULFATE 2.5; .5 MG/3ML; MG/3ML
3 SOLUTION RESPIRATORY (INHALATION) EVERY 4 HOURS
Status: DISCONTINUED | OUTPATIENT
Start: 2023-01-01 | End: 2023-01-01

## 2023-01-01 RX ORDER — DIGOXIN 0.25 MG/ML
125 INJECTION INTRAMUSCULAR; INTRAVENOUS ONCE
Status: COMPLETED | OUTPATIENT
Start: 2023-01-01 | End: 2023-01-01

## 2023-01-01 RX ORDER — ALBUMIN (HUMAN) 12.5 G/50ML
25 SOLUTION INTRAVENOUS
Status: DISCONTINUED | OUTPATIENT
Start: 2023-01-01 | End: 2023-01-01

## 2023-01-01 RX ORDER — BISACODYL 10 MG
10 SUPPOSITORY, RECTAL RECTAL DAILY
Status: DISCONTINUED | OUTPATIENT
Start: 2023-01-01 | End: 2023-01-01

## 2023-01-01 RX ORDER — FUROSEMIDE 10 MG/ML
40 INJECTION INTRAMUSCULAR; INTRAVENOUS
Status: DISCONTINUED | OUTPATIENT
Start: 2023-01-01 | End: 2023-01-01

## 2023-01-01 RX ORDER — POLYETHYLENE GLYCOL 3350 17 G/17G
17 POWDER, FOR SOLUTION ORAL DAILY PRN
Status: DISCONTINUED | OUTPATIENT
Start: 2023-01-01 | End: 2023-01-01

## 2023-01-01 RX ORDER — DEXTROSE MONOHYDRATE 25 G/50ML
50 INJECTION, SOLUTION INTRAVENOUS
Status: DISCONTINUED | OUTPATIENT
Start: 2023-01-01 | End: 2023-01-01

## 2023-01-01 RX ORDER — HALOPERIDOL 5 MG/ML
1 INJECTION INTRAMUSCULAR
Status: DISCONTINUED | OUTPATIENT
Start: 2023-01-01 | End: 2023-01-01

## 2023-01-01 RX ORDER — LORAZEPAM 2 MG/ML
1 INJECTION INTRAMUSCULAR ONCE
Status: COMPLETED | OUTPATIENT
Start: 2023-01-01 | End: 2023-01-01

## 2023-01-01 RX ORDER — LORAZEPAM 2 MG/ML
1 INJECTION INTRAMUSCULAR EVERY 30 MIN PRN
Status: DISCONTINUED | OUTPATIENT
Start: 2023-01-01 | End: 2023-01-01

## 2023-01-01 RX ORDER — IPRATROPIUM BROMIDE AND ALBUTEROL SULFATE 2.5; .5 MG/3ML; MG/3ML
3 SOLUTION RESPIRATORY (INHALATION) EVERY 6 HOURS PRN
Status: DISCONTINUED | OUTPATIENT
Start: 2023-01-01 | End: 2023-01-01

## 2023-01-01 RX ORDER — ONDANSETRON 2 MG/ML
4 INJECTION INTRAMUSCULAR; INTRAVENOUS AS NEEDED
Status: ACTIVE | OUTPATIENT
Start: 2023-01-01 | End: 2023-01-01

## 2023-01-01 RX ORDER — GLYCOPYRROLATE 0.2 MG/ML
0.4 INJECTION, SOLUTION INTRAMUSCULAR; INTRAVENOUS ONCE
Status: COMPLETED | OUTPATIENT
Start: 2023-01-01 | End: 2023-01-01

## 2023-01-01 RX ORDER — IPRATROPIUM BROMIDE AND ALBUTEROL SULFATE 2.5; .5 MG/3ML; MG/3ML
SOLUTION RESPIRATORY (INHALATION)
Status: COMPLETED
Start: 2023-01-01 | End: 2023-01-01

## 2023-01-01 RX ORDER — LORAZEPAM 2 MG/ML
0.5 INJECTION INTRAMUSCULAR EVERY 6 HOURS PRN
Status: DISCONTINUED | OUTPATIENT
Start: 2023-01-01 | End: 2023-01-01

## 2023-01-01 RX ORDER — ALBUMIN (HUMAN) 12.5 G/50ML
25 SOLUTION INTRAVENOUS ONCE
Status: COMPLETED | OUTPATIENT
Start: 2023-01-01 | End: 2023-01-01

## 2023-01-01 RX ORDER — ACETAMINOPHEN 10 MG/ML
1000 INJECTION, SOLUTION INTRAVENOUS EVERY 6 HOURS PRN
Status: DISCONTINUED | OUTPATIENT
Start: 2023-01-01 | End: 2023-01-01

## 2023-01-01 RX ORDER — SENNOSIDES 8.6 MG
17.2 TABLET ORAL NIGHTLY PRN
Status: DISCONTINUED | OUTPATIENT
Start: 2023-01-01 | End: 2023-01-01

## 2023-01-01 RX ORDER — IPRATROPIUM BROMIDE AND ALBUTEROL SULFATE 2.5; .5 MG/3ML; MG/3ML
3 SOLUTION RESPIRATORY (INHALATION)
Status: DISCONTINUED | OUTPATIENT
Start: 2023-01-01 | End: 2023-01-01

## 2023-01-01 RX ORDER — LORAZEPAM 2 MG/ML
1 INJECTION INTRAMUSCULAR EVERY 4 HOURS PRN
Status: DISCONTINUED | OUTPATIENT
Start: 2023-01-01 | End: 2023-01-01

## 2023-01-01 RX ORDER — SENNOSIDES 8.8 MG/5ML
5 LIQUID ORAL 2 TIMES DAILY
Status: DISCONTINUED | OUTPATIENT
Start: 2023-01-01 | End: 2023-01-01

## 2023-01-01 RX ORDER — POTASSIUM CHLORIDE 20 MEQ/1
40 TABLET, EXTENDED RELEASE ORAL ONCE
Status: COMPLETED | OUTPATIENT
Start: 2023-01-01 | End: 2023-01-01

## 2023-01-01 RX ORDER — BISACODYL 10 MG
10 SUPPOSITORY, RECTAL RECTAL
Status: DISCONTINUED | OUTPATIENT
Start: 2023-01-01 | End: 2023-01-01

## 2023-01-01 RX ORDER — ETOMIDATE 2 MG/ML
INJECTION INTRAVENOUS
Status: COMPLETED
Start: 2023-01-01 | End: 2023-01-01

## 2023-01-01 RX ORDER — ONDANSETRON 2 MG/ML
4 INJECTION INTRAMUSCULAR; INTRAVENOUS EVERY 6 HOURS PRN
Status: DISCONTINUED | OUTPATIENT
Start: 2023-01-01 | End: 2023-01-01

## 2023-01-01 RX ORDER — SENNOSIDES 8.8 MG/5ML
10 LIQUID ORAL NIGHTLY PRN
Status: DISCONTINUED | OUTPATIENT
Start: 2023-01-01 | End: 2023-01-01

## 2023-01-01 RX ORDER — GUAIFENESIN 600 MG/1
600 TABLET, EXTENDED RELEASE ORAL 2 TIMES DAILY
Status: DISCONTINUED | OUTPATIENT
Start: 2023-01-01 | End: 2023-01-01

## 2023-01-01 RX ORDER — METOPROLOL TARTRATE 5 MG/5ML
5 INJECTION INTRAVENOUS EVERY 6 HOURS
Status: DISCONTINUED | OUTPATIENT
Start: 2023-01-01 | End: 2023-01-01

## 2023-01-01 RX ORDER — SULFAMETHOXAZOLE AND TRIMETHOPRIM 80; 16 MG/ML; MG/ML
5 INJECTION, SOLUTION, CONCENTRATE INTRAVENOUS EVERY 8 HOURS
Status: DISCONTINUED | OUTPATIENT
Start: 2023-01-01 | End: 2023-01-01

## 2023-01-01 RX ORDER — NICOTINE POLACRILEX 4 MG
30 LOZENGE BUCCAL
Status: DISCONTINUED | OUTPATIENT
Start: 2023-01-01 | End: 2023-01-01

## 2023-01-01 RX ORDER — FUROSEMIDE 10 MG/ML
20 INJECTION INTRAMUSCULAR; INTRAVENOUS ONCE
Status: COMPLETED | OUTPATIENT
Start: 2023-01-01 | End: 2023-01-01

## 2023-01-01 RX ORDER — DILTIAZEM HYDROCHLORIDE 5 MG/ML
10 INJECTION INTRAVENOUS ONCE
Status: COMPLETED | OUTPATIENT
Start: 2023-01-01 | End: 2023-01-01

## 2023-01-01 RX ORDER — MORPHINE SULFATE 10 MG/5ML
2.5 SOLUTION ORAL EVERY 2 HOUR PRN
Status: DISCONTINUED | OUTPATIENT
Start: 2023-01-01 | End: 2023-01-01

## 2023-01-01 RX ORDER — FLUTICASONE FUROATE AND VILANTEROL 100; 25 UG/1; UG/1
1 POWDER RESPIRATORY (INHALATION) DAILY
Status: DISCONTINUED | OUTPATIENT
Start: 2023-01-01 | End: 2023-01-01

## 2023-01-01 RX ORDER — ETOMIDATE 2 MG/ML
0.3 INJECTION INTRAVENOUS ONCE
Status: COMPLETED | OUTPATIENT
Start: 2023-01-01 | End: 2023-01-01

## 2023-01-01 RX ORDER — IPRATROPIUM BROMIDE AND ALBUTEROL SULFATE 2.5; .5 MG/3ML; MG/3ML
3 SOLUTION RESPIRATORY (INHALATION) EVERY 4 HOURS PRN
Status: DISCONTINUED | OUTPATIENT
Start: 2023-01-01 | End: 2023-01-01

## 2023-01-01 RX ORDER — METOPROLOL TARTRATE 5 MG/5ML
2.5 INJECTION INTRAVENOUS ONCE
Status: COMPLETED | OUTPATIENT
Start: 2023-01-01 | End: 2023-01-01

## 2023-01-01 RX ORDER — SODIUM CHLORIDE 1 G/1
2 TABLET ORAL 2 TIMES DAILY WITH MEALS
Status: DISCONTINUED | OUTPATIENT
Start: 2023-01-01 | End: 2023-01-01

## 2023-01-01 RX ORDER — HYDROMORPHONE HYDROCHLORIDE 1 MG/ML
0.4 INJECTION, SOLUTION INTRAMUSCULAR; INTRAVENOUS; SUBCUTANEOUS ONCE
Status: COMPLETED | OUTPATIENT
Start: 2023-01-01 | End: 2023-01-01

## 2023-01-01 RX ORDER — ACETAMINOPHEN 325 MG/1
650 TABLET ORAL EVERY 4 HOURS PRN
Status: DISCONTINUED | OUTPATIENT
Start: 2023-01-01 | End: 2023-01-01

## 2023-01-01 RX ORDER — PHENYLEPHRINE HCL IN 0.9% NACL 50MG/250ML
PLASTIC BAG, INJECTION (ML) INTRAVENOUS CONTINUOUS
Status: DISCONTINUED | OUTPATIENT
Start: 2023-01-01 | End: 2023-01-01 | Stop reason: ALTCHOICE

## 2023-01-01 RX ORDER — GLYCOPYRROLATE 0.2 MG/ML
0.2 INJECTION, SOLUTION INTRAMUSCULAR; INTRAVENOUS
Status: DISCONTINUED | OUTPATIENT
Start: 2023-01-01 | End: 2023-01-01

## 2023-05-25 ENCOUNTER — APPOINTMENT (OUTPATIENT)
Dept: GENERAL RADIOLOGY | Age: 53
End: 2023-05-25
Attending: HOSPITALIST

## 2023-05-25 ENCOUNTER — HOSPITAL ENCOUNTER (OUTPATIENT)
Age: 53
Setting detail: OBSERVATION
LOS: 1 days | Discharge: HOME | End: 2023-05-26
Attending: HOSPITALIST | Admitting: HOSPITALIST

## 2023-05-25 ENCOUNTER — IMAGING SERVICES (OUTPATIENT)
Dept: OTHER | Age: 53
End: 2023-05-25

## 2023-05-25 PROBLEM — J90 PLEURAL EFFUSION: Status: ACTIVE | Noted: 2023-05-25

## 2023-05-25 PROCEDURE — 10000002 HB ROOM CHARGE MED SURG

## 2023-05-25 RX ORDER — 0.9 % SODIUM CHLORIDE 0.9 %
2 VIAL (ML) INJECTION EVERY 12 HOURS SCHEDULED
Status: DISCONTINUED | OUTPATIENT
Start: 2023-05-26 | End: 2023-05-26 | Stop reason: HOSPADM

## 2023-05-25 RX ORDER — PROCHLORPERAZINE EDISYLATE 5 MG/ML
5 INJECTION INTRAMUSCULAR; INTRAVENOUS EVERY 4 HOURS PRN
Status: DISCONTINUED | OUTPATIENT
Start: 2023-05-25 | End: 2023-05-26 | Stop reason: HOSPADM

## 2023-05-25 RX ORDER — ACETAMINOPHEN 325 MG/1
650 TABLET ORAL EVERY 4 HOURS PRN
Status: DISCONTINUED | OUTPATIENT
Start: 2023-05-25 | End: 2023-05-26 | Stop reason: HOSPADM

## 2023-05-25 RX ORDER — SODIUM CHLORIDE 9 MG/ML
INJECTION, SOLUTION INTRAVENOUS CONTINUOUS
Status: DISCONTINUED | OUTPATIENT
Start: 2023-05-25 | End: 2023-05-26

## 2023-05-25 SDOH — SOCIAL STABILITY: SOCIAL NETWORK: SUPPORT SYSTEMS: CHILDREN;SPOUSE

## 2023-05-25 SDOH — ECONOMIC STABILITY: TRANSPORTATION INSECURITY
IN THE PAST 12 MONTHS, HAS LACK OF TRANSPORTATION KEPT YOU FROM MEETINGS, WORK, OR FROM GETTING THINGS NEEDED FOR DAILY LIVING?: NO

## 2023-05-25 SDOH — HEALTH STABILITY: GENERAL: BECAUSE OF A PHYSICAL, MENTAL, OR EMOTIONAL CONDITION, DO YOU HAVE DIFFICULTY DOING ERRANDS ALONE?: NO

## 2023-05-25 SDOH — HEALTH STABILITY: PHYSICAL HEALTH: DO YOU HAVE DIFFICULTY DRESSING OR BATHING?: NO

## 2023-05-25 SDOH — ECONOMIC STABILITY: HOUSING INSECURITY: WHAT IS YOUR LIVING SITUATION TODAY?: SPOUSE;CHILDREN

## 2023-05-25 SDOH — HEALTH STABILITY: GENERAL
BECAUSE OF A PHYSICAL, MENTAL, OR EMOTIONAL CONDITION, DO YOU HAVE SERIOUS DIFFICULTY CONCENTRATING, REMEMBERING OR MAKING DECISIONS?: NO

## 2023-05-25 SDOH — ECONOMIC STABILITY: FOOD INSECURITY: HOW OFTEN IN THE PAST 12 MONTHS WERE YOU WORRIED OR STRESSED ABOUT HAVING ENOUGH MONEY TO BUY NUTRITIOUS MEALS?: NEVER

## 2023-05-25 SDOH — HEALTH STABILITY: PHYSICAL HEALTH: DO YOU HAVE SERIOUS DIFFICULTY WALKING OR CLIMBING STAIRS?: NO

## 2023-05-25 SDOH — ECONOMIC STABILITY: HOUSING INSECURITY: WHAT IS YOUR LIVING SITUATION TODAY?: HOUSE

## 2023-05-25 SDOH — ECONOMIC STABILITY: TRANSPORTATION INSECURITY
IN THE PAST 12 MONTHS, HAS THE LACK OF TRANSPORTATION KEPT YOU FROM MEDICAL APPOINTMENTS OR FROM GETTING MEDICATIONS?: NO

## 2023-05-25 SDOH — SOCIAL STABILITY: SOCIAL NETWORK
HOW OFTEN DO YOU SEE OR TALK TO PEOPLE THAT YOU CARE ABOUT AND FEEL CLOSE TO? (FOR EXAMPLE: TALKING TO FRIENDS ON THE PHONE, VISITING FRIENDS OR FAMILY, GOING TO CHURCH OR CLUB MEETINGS): 5 OR MORE TIMES A WEEK

## 2023-05-25 SDOH — ECONOMIC STABILITY: HOUSING INSECURITY: ARE YOU WORRIED ABOUT LOSING YOUR HOUSING?: NO

## 2023-05-25 SDOH — ECONOMIC STABILITY: GENERAL

## 2023-05-25 ASSESSMENT — LIFESTYLE VARIABLES
ALCOHOL_USE_STATUS: NO OR LOW RISK WITH VALIDATED TOOL
HOW OFTEN DO YOU HAVE 6 OR MORE DRINKS ON ONE OCCASION: NEVER
AUDIT-C TOTAL SCORE: 1
HOW OFTEN DO YOU HAVE A DRINK CONTAINING ALCOHOL: MONTHLY OR LESS
HOW MANY STANDARD DRINKS CONTAINING ALCOHOL DO YOU HAVE ON A TYPICAL DAY: 0,1 OR 2

## 2023-05-25 ASSESSMENT — COLUMBIA-SUICIDE SEVERITY RATING SCALE - C-SSRS
2. HAVE YOU ACTUALLY HAD ANY THOUGHTS OF KILLING YOURSELF?: NO
6. HAVE YOU EVER DONE ANYTHING, STARTED TO DO ANYTHING, OR PREPARED TO DO ANYTHING TO END YOUR LIFE?: NO
IS THE PATIENT ABLE TO COMPLETE C-SSRS: YES
1. WITHIN THE PAST MONTH, HAVE YOU WISHED YOU WERE DEAD OR WISHED YOU COULD GO TO SLEEP AND NOT WAKE UP?: NO

## 2023-05-25 ASSESSMENT — PAIN SCALES - GENERAL: PAINLEVEL_OUTOF10: 0

## 2023-05-25 ASSESSMENT — PATIENT HEALTH QUESTIONNAIRE - PHQ9
2. FEELING DOWN, DEPRESSED OR HOPELESS: NOT AT ALL
CLINICAL INTERPRETATION OF PHQ2 SCORE: NO FURTHER SCREENING NEEDED
1. LITTLE INTEREST OR PLEASURE IN DOING THINGS: NOT AT ALL
IS PATIENT ABLE TO COMPLETE PHQ2 OR PHQ9: YES
SUM OF ALL RESPONSES TO PHQ9 QUESTIONS 1 AND 2: 0
SUM OF ALL RESPONSES TO PHQ9 QUESTIONS 1 AND 2: 0

## 2023-05-25 ASSESSMENT — ACTIVITIES OF DAILY LIVING (ADL)
RECENT_DECLINE_ADL: NO
ADL_SHORT_OF_BREATH: YES
ADL_SCORE: 12
ADL_BEFORE_ADMISSION: INDEPENDENT

## 2023-05-26 ENCOUNTER — APPOINTMENT (OUTPATIENT)
Dept: GENERAL RADIOLOGY | Age: 53
End: 2023-05-26
Attending: HOSPITALIST

## 2023-05-26 ENCOUNTER — APPOINTMENT (OUTPATIENT)
Dept: INTERVENTIONAL RADIOLOGY/VASCULAR | Age: 53
End: 2023-05-26
Attending: HOSPITALIST

## 2023-05-26 VITALS
SYSTOLIC BLOOD PRESSURE: 137 MMHG | TEMPERATURE: 98.6 F | WEIGHT: 140.87 LBS | OXYGEN SATURATION: 95 % | HEART RATE: 112 BPM | RESPIRATION RATE: 18 BRPM | HEIGHT: 63 IN | BODY MASS INDEX: 24.96 KG/M2 | DIASTOLIC BLOOD PRESSURE: 89 MMHG

## 2023-05-26 LAB
ANION GAP SERPL CALC-SCNC: 10 MMOL/L (ref 7–19)
APPEARANCE FLD: ABNORMAL
BASOPHILS # BLD: 0.1 K/MCL (ref 0–0.3)
BASOPHILS NFR BLD: 1 %
BUN SERPL-MCNC: 20 MG/DL (ref 6–20)
BUN/CREAT SERPL: 27 (ref 7–25)
CALCIUM SERPL-MCNC: 10.5 MG/DL (ref 8.4–10.2)
CHLORIDE SERPL-SCNC: 107 MMOL/L (ref 97–110)
CO2 SERPL-SCNC: 24 MMOL/L (ref 21–32)
COLOR FLD: ABNORMAL
CREAT SERPL-MCNC: 0.74 MG/DL (ref 0.67–1.17)
DEPRECATED RDW RBC: 47.8 FL (ref 39–50)
EOSINOPHIL # BLD: 0.5 K/MCL (ref 0–0.5)
EOSINOPHIL NFR BLD: 6 %
ERYTHROCYTE [DISTWIDTH] IN BLOOD: 14.7 % (ref 11–15)
FASTING DURATION TIME PATIENT: ABNORMAL H
GFR SERPLBLD BASED ON 1.73 SQ M-ARVRAT: >90 ML/MIN
GLUCOSE FLD-MCNC: 77 MG/DL (ref 80–120)
GLUCOSE SERPL-MCNC: 80 MG/DL (ref 70–99)
HCT VFR BLD CALC: 36.7 % (ref 39–51)
HGB BLD-MCNC: 11.7 G/DL (ref 13–17)
IMM GRANULOCYTES # BLD AUTO: 0 K/MCL (ref 0–0.2)
IMM GRANULOCYTES # BLD: 0 %
INR PPP: 1.4
LDH FLD L TO P-CCNC: 364 UNITS/L (ref 40–120)
LYMPHOCYTES # BLD: 1.5 K/MCL (ref 1–4)
LYMPHOCYTES NFR BLD: 18 %
LYMPHOCYTES NFR FLD: 76 %
MCH RBC QN AUTO: 28.7 PG (ref 26–34)
MCHC RBC AUTO-ENTMCNC: 31.9 G/DL (ref 32–36.5)
MCV RBC AUTO: 90 FL (ref 78–100)
MESOTHL CELL NFR FLD: 7 %
MONOCYTES # BLD: 0.8 K/MCL (ref 0.3–0.9)
MONOCYTES NFR BLD: 10 %
MONOCYTES NFR FLD: 12 %
NEUTROPHILS # BLD: 5.3 K/MCL (ref 1.8–7.7)
NEUTROPHILS NFR BLD: 65 %
NEUTS SEG NFR FLD: 5 %
NRBC BLD MANUAL-RTO: 0 /100 WBC
PATH REV BLD -IMP: YES
PATH REV: ABNORMAL
PLATELET # BLD AUTO: 435 K/MCL (ref 140–450)
POTASSIUM SERPL-SCNC: 3.8 MMOL/L (ref 3.4–5.1)
PROT FLD-MCNC: 6 G/DL (ref 1–2.5)
PROTHROMBIN TIME: 13.5 SEC (ref 9.7–11.8)
RBC # BLD: 4.08 MIL/MCL (ref 4.5–5.9)
SODIUM SERPL-SCNC: 137 MMOL/L (ref 135–145)
TOTAL CELLS COUNTED FLD: 100
WBC # BLD: 8.2 K/MCL (ref 4.2–11)
WBC # FLD: 2286 /MCL (ref 0–1000)

## 2023-05-26 PROCEDURE — 85025 COMPLETE CBC W/AUTO DIFF WBC: CPT | Performed by: HOSPITALIST

## 2023-05-26 PROCEDURE — G0378 HOSPITAL OBSERVATION PER HR: HCPCS

## 2023-05-26 PROCEDURE — 10002807 HB RX 258: Performed by: HOSPITALIST

## 2023-05-26 PROCEDURE — 10002803 HB RX 637: Performed by: HOSPITALIST

## 2023-05-26 PROCEDURE — 88305 TISSUE EXAM BY PATHOLOGIST: CPT | Performed by: INTERNAL MEDICINE

## 2023-05-26 PROCEDURE — 84157 ASSAY OF PROTEIN OTHER: CPT | Performed by: INTERNAL MEDICINE

## 2023-05-26 PROCEDURE — 71045 X-RAY EXAM CHEST 1 VIEW: CPT

## 2023-05-26 PROCEDURE — 71046 X-RAY EXAM CHEST 2 VIEWS: CPT

## 2023-05-26 PROCEDURE — 32555 ASPIRATE PLEURA W/ IMAGING: CPT

## 2023-05-26 PROCEDURE — 83615 LACTATE (LD) (LDH) ENZYME: CPT | Performed by: INTERNAL MEDICINE

## 2023-05-26 PROCEDURE — 82945 GLUCOSE OTHER FLUID: CPT | Performed by: INTERNAL MEDICINE

## 2023-05-26 PROCEDURE — 10004180 HB COUNTER-TRANSPORT

## 2023-05-26 PROCEDURE — 36415 COLL VENOUS BLD VENIPUNCTURE: CPT | Performed by: HOSPITALIST

## 2023-05-26 PROCEDURE — 80048 BASIC METABOLIC PNL TOTAL CA: CPT | Performed by: HOSPITALIST

## 2023-05-26 PROCEDURE — 87070 CULTURE OTHR SPECIMN AEROBIC: CPT | Performed by: INTERNAL MEDICINE

## 2023-05-26 PROCEDURE — 89051 BODY FLUID CELL COUNT: CPT | Performed by: INTERNAL MEDICINE

## 2023-05-26 PROCEDURE — 85610 PROTHROMBIN TIME: CPT | Performed by: HOSPITALIST

## 2023-05-26 PROCEDURE — 94660 CPAP INITIATION&MGMT: CPT

## 2023-05-26 RX ORDER — ENALAPRIL MALEATE 10 MG/1
20 TABLET ORAL DAILY
Status: DISCONTINUED | OUTPATIENT
Start: 2023-05-26 | End: 2023-05-26 | Stop reason: HOSPADM

## 2023-05-26 RX ORDER — ATORVASTATIN CALCIUM 40 MG/1
40 TABLET, FILM COATED ORAL EVERY EVENING
Status: DISCONTINUED | OUTPATIENT
Start: 2023-05-26 | End: 2023-05-26 | Stop reason: HOSPADM

## 2023-05-26 RX ORDER — ALBUTEROL SULFATE 90 UG/1
2 AEROSOL, METERED RESPIRATORY (INHALATION) EVERY 4 HOURS PRN
COMMUNITY

## 2023-05-26 RX ORDER — POTASSIUM CHLORIDE 10 MEQ
20 TABLET, EXT RELEASE, PARTICLES/CRYSTALS ORAL 2 TIMES DAILY
COMMUNITY

## 2023-05-26 RX ORDER — ENALAPRIL MALEATE 10 MG/1
10 TABLET ORAL DAILY
COMMUNITY

## 2023-05-26 RX ORDER — HYDROCHLOROTHIAZIDE 25 MG/1
25 TABLET ORAL DAILY
Status: DISCONTINUED | OUTPATIENT
Start: 2023-05-26 | End: 2023-05-26 | Stop reason: HOSPADM

## 2023-05-26 RX ORDER — ENALAPRIL MALEATE AND HYDROCHLOROTHIAZIDE 10; 25 MG/1; MG/1
1 TABLET ORAL DAILY
COMMUNITY

## 2023-05-26 RX ORDER — ATORVASTATIN CALCIUM 40 MG/1
40 TABLET, FILM COATED ORAL EVERY EVENING
COMMUNITY

## 2023-05-26 RX ADMIN — ATORVASTATIN CALCIUM 40 MG: 40 TABLET, FILM COATED ORAL at 19:42

## 2023-05-26 RX ADMIN — ENALAPRIL MALEATE 20 MG: 10 TABLET ORAL at 15:18

## 2023-05-26 RX ADMIN — SODIUM CHLORIDE: 9 INJECTION, SOLUTION INTRAVENOUS at 00:59

## 2023-05-26 RX ADMIN — HYDROCHLOROTHIAZIDE 25 MG: 25 TABLET ORAL at 15:19

## 2023-05-26 ASSESSMENT — ENCOUNTER SYMPTOMS
WHEEZING: 0
COUGH: 1
UNEXPECTED WEIGHT CHANGE: 0
SHORTNESS OF BREATH: 1
WEAKNESS: 0
FATIGUE: 0
RHINORRHEA: 0
CHILLS: 0
FEVER: 0
SINUS PRESSURE: 0
VOMITING: 0
ADENOPATHY: 0
NUMBNESS: 0
HALLUCINATIONS: 0
PHOTOPHOBIA: 0
HEADACHES: 0
ABDOMINAL PAIN: 0
SORE THROAT: 0
NAUSEA: 0
DIARRHEA: 0

## 2023-05-26 ASSESSMENT — PAIN SCALES - GENERAL: PAINLEVEL_OUTOF10: 0

## 2023-05-30 LAB
ASR DISCLAIMER: NORMAL
CASE RPRT: NORMAL
CLINICAL INFO: NORMAL
PATH REPORT.FINAL DX SPEC: NORMAL
PATH REPORT.GROSS SPEC: NORMAL

## 2023-05-31 LAB
BACTERIA SPEC ANAEROBE+AEROBE CULT: NORMAL
GRAM STN SPEC: NORMAL
GRAM STN SPEC: NORMAL

## 2023-06-07 PROCEDURE — 88342 IMHCHEM/IMCYTCHM 1ST ANTB: CPT | Performed by: PATHOLOGY

## 2023-06-07 PROCEDURE — 88341 IMHCHEM/IMCYTCHM EA ADD ANTB: CPT | Performed by: PATHOLOGY

## 2023-06-09 ENCOUNTER — LAB REQUISITION (OUTPATIENT)
Age: 53
End: 2023-06-09
Payer: COMMERCIAL

## 2023-06-09 DIAGNOSIS — D48.9 NEOPLASM OF UNCERTAIN BEHAVIOR: ICD-10-CM

## 2023-06-22 PROCEDURE — 88342 IMHCHEM/IMCYTCHM 1ST ANTB: CPT | Performed by: PATHOLOGY

## 2023-06-22 PROCEDURE — 88341 IMHCHEM/IMCYTCHM EA ADD ANTB: CPT | Performed by: PATHOLOGY

## 2023-06-23 ENCOUNTER — LAB REQUISITION (OUTPATIENT)
Age: 53
End: 2023-06-23
Payer: COMMERCIAL

## 2023-06-23 DIAGNOSIS — D48.9 NEOPLASM OF UNCERTAIN BEHAVIOR: ICD-10-CM

## 2023-07-24 PROBLEM — E87.1 HYPONATREMIA: Status: ACTIVE | Noted: 2023-07-24

## 2023-07-24 PROBLEM — E87.1 HYPONATREMIA: Status: ACTIVE | Noted: 2023-01-01

## 2023-07-25 PROBLEM — R00.0 TACHYCARDIA: Status: ACTIVE | Noted: 2023-07-25

## 2023-07-25 PROBLEM — J90 PLEURAL EFFUSION: Status: ACTIVE | Noted: 2023-01-01

## 2023-07-25 PROBLEM — J90 PLEURAL EFFUSION: Status: ACTIVE | Noted: 2023-07-25

## 2023-07-25 PROBLEM — C22.0 HEPATOCELLULAR CARCINOMA (HCC): Status: ACTIVE | Noted: 2023-01-01

## 2023-07-25 PROBLEM — R00.0 TACHYCARDIA: Status: ACTIVE | Noted: 2023-01-01

## 2023-07-25 PROBLEM — D64.9 ANEMIA, UNSPECIFIED TYPE: Status: ACTIVE | Noted: 2023-01-01

## 2023-07-25 PROBLEM — R06.02 SHORTNESS OF BREATH: Status: ACTIVE | Noted: 2023-01-01

## 2023-07-25 PROBLEM — D64.9 ANEMIA, UNSPECIFIED TYPE: Status: ACTIVE | Noted: 2023-07-25

## 2023-07-25 PROBLEM — R06.02 SHORTNESS OF BREATH: Status: ACTIVE | Noted: 2023-07-25

## 2023-07-25 PROBLEM — C22.0 HEPATOCELLULAR CARCINOMA (HCC): Status: ACTIVE | Noted: 2023-07-25

## 2023-07-25 NOTE — OCCUPATIONAL THERAPY NOTE
OT order received, chart reviewed, pt off floor at CT, spoke with RN, OT will follow up as pt is approp.

## 2023-07-25 NOTE — PROGRESS NOTES
07/25/23 1007   Clinical Encounter Type   Visited With Patient; Health care provider   Routine Visit Introduction   Continue Visiting   (patient will let his RN know to page  to come back for POA)   Taxonomy   Intended Effects Aligning care plan with patient's values   Methods Collaborate with care team member;Demonstrate acceptance   Interventions Active listening; Ask guided questions;Assist someone with Advance Directives     Discussion:   responded to Spiritual Care consult for [facility].  reviewed Pt's EMR and paper chart; no documentation was located.  consulted with Pt's RN Clarice Horton who verified Pt is decisional.  met with Pt (Pedro) and explained POAHC. Orchase requested to review paperwork with his wife later today.  advised Orchase to ask his RN to page/call  when he is ready to sign paperwork. Spiritual Care support can be requested via an [employer] consult or ext. [phone].        Mei Purcell M.Div, Chaplain Resident  Ext. [phone]

## 2023-07-25 NOTE — RESPIRATORY THERAPY NOTE
Patient with a history of NEIDA. He states that he has a cpap at home but is no longer using it and does not wish to be set up with one her. NEIDA standing orders in place.

## 2023-07-25 NOTE — ED QUICK NOTES
Orders for admission, patient is aware of plan and ready to go upstairs. Any questions, please call ED RN Mani Elam at extension [phone].      Patient Covid vaccination status: Fully vaccinated     COVID Test Ordered in ED: None    COVID Suspicion at Admission: N/A    Running Infusions:  None    Mental Status/LOC at time of transport: A&O x3    Other pertinent information:   CIWA score: N/A   NIH score:  N/A

## 2023-07-25 NOTE — PROGRESS NOTES
NURSING ADMISSION NOTE    Patient is A/O x4, admitted d/t ANN, fever and tachycardia. Reported recent liver CA dx -  had seen [facility] doctor but no recent follow up, would like to see doctor from [facility]. Observed labored breathing with stable O2 sat at 96% RA at rest, requested for O2 1L/NC for comfort breathing. Owns Cpap at home for NEIDA but had not actively used recently d/t tubing issue - RT called. Tachy at 120s but denies chest pain. Unable to bear weight on BLE d/t polio, brought his own wheelchair from home. Febrile at 101.1F, given acetaminophen PRN. History of fall. Navigator completed. MD paged for med rec. Reported diarrhea x3 or more but stopped after wife gave him imodium, fall and cdiff protocol placed. Needs addressed. Call light within reach. Patient and spouse notified of POC. IVF and IV abt running. 0430 Sepsis alert fired, temp at 101.1F - given acetaminophen. MD updated. IV abt and IVF running.     0700 Nephrology consult contacted for hyponatremia. Patient admitted via [address] to room. Safety precautions initiated. Bed in low position. Call light in reach.

## 2023-07-25 NOTE — PLAN OF CARE
Pt A/O x4. VSS, febrile today w/ max temp of 102. Sepsis BPA fired. Pt shivering occasionally, PRN tylenol admin per MAR. MD notified (hospitalist and ID). Lactate lab ordered. Medications admin per MAR. CT abd/ pelvis done today. UA done today. ID and Onc consulted. I/O's. Pt up to bathroom in wheelchair w/ x1 assist. Fall and safety precautions in place. Call light within reach.

## 2023-07-26 NOTE — PLAN OF CARE
Patient alert and oriented x4. VSS - HR 110s. CPAP at night. Tmax = 102.7. PRN tylenol administered. MD notified, no new orders received. Sepsis BPA fired - MD notified, no new orders received. Temp recheck = 99.4. No c/o pain. Medications administered per MAR. Family at bedside. Safety precautions continued. Call light within reach. Problem: CARDIOVASCULAR - ADULT  Goal: Maintains optimal cardiac output and hemodynamic stability  Description: INTERVENTIONS:  - Monitor vital signs, rhythm, and trends  - Monitor for bleeding, hypotension and signs of decreased cardiac output  - Evaluate effectiveness of vasoactive medications to optimize hemodynamic stability  - Monitor arterial and/or venous puncture sites for bleeding and/or hematoma  - Assess quality of pulses, skin color and temperature  - Assess for signs of decreased coronary artery perfusion - ex. Angina  - Evaluate fluid balance, assess for edema, trend weights  Outcome: Progressing  Goal: Absence of cardiac arrhythmias or at baseline  Description: INTERVENTIONS:  - Continuous cardiac monitoring, monitor vital signs, obtain 12 lead EKG if indicated  - Evaluate effectiveness of antiarrhythmic and heart rate control medications as ordered  - Initiate emergency measures for life threatening arrhythmias  - Monitor electrolytes and administer replacement therapy as ordered  Outcome: Progressing     Problem: SAFETY ADULT - FALL  Goal: Free from fall injury  Description: INTERVENTIONS:  - Assess pt frequently for physical needs  - Identify cognitive and physical deficits and behaviors that affect risk of falls.   - [place] fall precautions as indicated by assessment.  - Educate pt/family on patient safety including physical limitations  - Instruct pt to call for assistance with activity based on assessment  - Modify environment to reduce risk of injury  - Provide assistive devices as appropriate  - Consider OT/PT consult to assist with strengthening/mobility  - Encourage toileting schedule  Outcome: Progressing     Problem: RISK FOR INFECTION - ADULT  Goal: Absence of fever/infection during anticipated neutropenic period  Description: INTERVENTIONS  - Monitor WBC  - Administer growth factors as ordered  - Implement neutropenic guidelines  Outcome: Not Progressing

## 2023-07-26 NOTE — PLAN OF CARE
Cdiff iso and collection discontinued due to no bm for over 24 hours  Mobility assist by wife who is at bedside  Cpap at night  Drank 1L of water last night. Education given on fluid restrictions and na levels  IV maxipime  PTOT  Urine sample sent as ordered  Per patient sacrum discolored for scooting on floor for mobility, but has been that way for years.  Declines assessment of area  Continues to run low grade temps

## 2023-07-26 NOTE — PHYSICAL THERAPY NOTE
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 ASSESSMENT   In this PT evaluation, the patient presents with the following impairments Decrease Strength and Decrease weakness. These impairments and comorbidities manifest themselves as functional limitations in independent bed mobility, transfers, and gait. The patient is below baseline and would benefit from skilled inpatient PT to address the above deficits to assist patient in returning to prior to level of function. Functional outcome measures completed include [facility]. The AM-PAC '6-Clicks' Inpatient Basic Mobility Short Form was completed and this patient is demonstrating a Approx Degree of Impairment: 46.58%  degree of impairment in mobility. Research supports that patients with this level of impairment may benefit from Home. DISCHARGE RECOMMENDATIONS  PT Discharge Recommendations: Home    PLAN  PT Treatment Plan: Bed mobility; Body mechanics;Gait training;Strengthening;Transfer training;Patient education  Rehab Potential : Good  Frequency (Obs): 3-5x/week  Number of Visits to Meet Established Goals: 3      CURRENT GOALS    Goal #1 Patient is able to demonstrate supine - sit EOB @ level: modified independent     Goal #2 Patient is able to demonstrate transfers EOB to/from [facility] at assistance level: modified independent     Goal #3 Patient is able to ambulate 50 feet with assist device: crutches (axillary) at assistance level: modified independent     Goal #4    Goal #5    Goal #6    Goal Comments: Goals established on 7/26/2023    HOME SITUATION  Type of Home: House Home Layout: Two level  Stairs to Enter : 0     Stairs to Bedroom: 14       Lives With: Spouse;Daughter; Son  Drives: Yes  Patient Owned Equipment: Crutches; Wheelchair  Patient Regularly Uses: Reading glasses    Prior Level of [place]: Pt reports that he lives at home with his wife and children (daughter and son). Pt states that he usually uses his wheelchair to navigate long distances but occasionally uses crutches to ambulate. Pt uses a bump techniques to navigate stairs. Pt is independent with all ADLs. SUBJECTIVE  \"I just hard to breath\"      OBJECTIVE  Precautions: None  Fall Risk: Standard fall risk    WEIGHT BEARING RESTRICTION                   PAIN ASSESSMENT  Ratin          COGNITION  Overall Cognitive Status:  WFL - within functional limits    RANGE OF MOTION AND STRENGTH ASSESSMENT  Upper extremity ROM and strength are within functional limits     Lower extremity ROM is within functional limits     Lower extremity strength is within functional limits       BALANCE  Static Sitting: Normal  Dynamic Sitting: Normal  Static Standing: Not tested  Dynamic Standing: Not tested    ADDITIONAL TESTS                                    ACTIVITY TOLERANCE                         O2 WALK  Oxygen Therapy  SPO2% on Room Air at Rest: 95    NEUROLOGICAL FINDINGS                        AM-PAC '6-Clicks' INPATIENT SHORT FORM - BASIC MOBILITY  How much difficulty does the patient currently have. .. Patient Difficulty: Turning over in bed (including adjusting bedclothes, sheets and blankets)?: A Little   Patient Difficulty: Sitting down on and standing up from a chair with arms (e.g., wheelchair, bedside commode, etc.): A Little   Patient Difficulty: Moving from lying on back to sitting on the side of the bed?: A Little   How much help from another person does the patient currently need. ..    Help from Another: Moving to and from a bed to a chair (including a wheelchair)?: A Little   Help from Another: Need to walk in hospital room?: A Little   Help from Another: Climbing 3-5 steps with a railing?: A Little       AM-PAC Score:  Raw Score: 18   Approx Degree of Impairment: 46.58%   Standardized Score (AM-PAC Scale): 43.63   CMS Modifier (G-Code): CK    FUNCTIONAL ABILITY STATUS  Gait Assessment   Functional Mobility/Gait Assessment  Gait Assistance: Not tested    Skilled Therapy Provided     Bed Mobility:  Rolling: Supervision  Supine to sit: Supervision   Sit to supine: NT     Transfer Mobility:  Sit to stand: NT   Stand to sit: NT  Gait = NT    Therapist's Comments: Pt is observed with decrease BLE leg lengths. Pt was educated on the benefits of PT to prevent weakness and endurance while staying in the hospital. Pt's BP was 113/86 with a HR of 123-127 BPM while sitting at the EOB. Pt did not report any symptoms. Exercise/Education Provided:  Bed mobility  Body mechanics  Functional activity tolerated  Transfer training    Patient End of Session: In bed;Needs met;Call light within reach; All patient questions and concerns addressed;RN aware of session/findings; Family present      Patient Evaluation Complexity Level:  History Moderate - 1 or 2 personal factors and/or co-morbidities   Examination of body systems Low - addressing 1-2 elements   Clinical Presentation Low - Stable   Clinical Decision Making Low - Stable       PT Session Time: 30 minutes  Therapeutic Activity: 15 minutes

## 2023-07-26 NOTE — PROGRESS NOTES
Pt's temp 101.7 after PRN tylenol and ice packs. Pt resting in bed, other VS stable. Hospitalist and ID notified, endorsed to night RN.

## 2023-07-27 NOTE — PLAN OF CARE
Patient alert and oriented x4. VSS - HR 110s-120s. Tmax = 100.6. PRN tylenol administered. CPAP on at night. C/o abd pain. Hot pack provided. Medications administered per MAR. Na = 128. MD notified, IVF discontinued. Family at bedside. Safety precautions continued. Call light within reach. 0443: Temp = 97.7  Problem: CARDIOVASCULAR - ADULT  Goal: Maintains optimal cardiac output and hemodynamic stability  Description: INTERVENTIONS:  - Monitor vital signs, rhythm, and trends  - Monitor for bleeding, hypotension and signs of decreased cardiac output  - Evaluate effectiveness of vasoactive medications to optimize hemodynamic stability  - Monitor arterial and/or venous puncture sites for bleeding and/or hematoma  - Assess quality of pulses, skin color and temperature  - Assess for signs of decreased coronary artery perfusion - ex. Angina  - Evaluate fluid balance, assess for edema, trend weights  Outcome: Progressing  Goal: Absence of cardiac arrhythmias or at baseline  Description: INTERVENTIONS:  - Continuous cardiac monitoring, monitor vital signs, obtain 12 lead EKG if indicated  - Evaluate effectiveness of antiarrhythmic and heart rate control medications as ordered  - Initiate emergency measures for life threatening arrhythmias  - Monitor electrolytes and administer replacement therapy as ordered  Outcome: Progressing     Problem: SAFETY ADULT - FALL  Goal: Free from fall injury  Description: INTERVENTIONS:  - Assess pt frequently for physical needs  - Identify cognitive and physical deficits and behaviors that affect risk of falls.   - [place] fall precautions as indicated by assessment.  - Educate pt/family on patient safety including physical limitations  - Instruct pt to call for assistance with activity based on assessment  - Modify environment to reduce risk of injury  - Provide assistive devices as appropriate  - Consider OT/PT consult to assist with strengthening/mobility  - Encourage toileting schedule  Outcome: Progressing     Problem: RISK FOR INFECTION - ADULT  Goal: Absence of fever/infection during anticipated neutropenic period  Description: INTERVENTIONS  - Monitor WBC  - Administer growth factors as ordered  - Implement neutropenic guidelines  Outcome: Not Progressing

## 2023-07-28 PROBLEM — Z71.89 COUNSELING REGARDING ADVANCE CARE PLANNING AND GOALS OF CARE: Status: ACTIVE | Noted: 2023-07-28

## 2023-07-28 PROBLEM — Z51.5 PALLIATIVE CARE ENCOUNTER: Status: ACTIVE | Noted: 2019-08-15

## 2023-07-28 PROBLEM — Z71.89 COUNSELING REGARDING ADVANCE CARE PLANNING AND GOALS OF CARE: Status: ACTIVE | Noted: 2023-01-01

## 2023-07-28 NOTE — PHYSICAL THERAPY NOTE
Attempted to see pt for PT services. Pt is occupied with MD at this time. Will continue to follow as appropriate.

## 2023-07-28 NOTE — PROGRESS NOTES
Patient alert x4, VSS. All meds given per [facility]. Complaint of pain during the night. Pain meds given with moderate relief. IV abx. Neb treat given over night to make pt comfortable. MD paged about elevated HR. No new orders. Sepsis BPA popped up. Md paged and made aware. No new orders. Pt had slight fever over night of 100 degrees. Tylenol given. Family at bedside. Bed alarm on. Rounded on frequently.

## 2023-07-28 NOTE — PROGRESS NOTES
Received patient alert and oriented x4. Medications given per MAR. Patient on fluid restriction. Educated patient and family on importance of sticking to fluid restriction. Pulm consulted. Safety precautions in place. Call light within reach.

## 2023-07-28 NOTE — PROGRESS NOTES
07/28/23 0910   Advance Directives for Healthcare   Does the patient have:  Power of  for Healthcare   Is there a copy on the chart? Yes   [facility] Agent's Name [address] [facility] Agent's Phone Number [phone]     Created a new POA for Healthcare document that, per the patient, accurately reflects their wishes. Gave the patient the original POA document along with copies. Confirmed that the PoA primary agent name and contact information have been entered into the Epic, Advance Directives section. A copy of the new POA document has been placed on the patient's paper chart. Spiritual care can be requested via an [facility] consult.

## 2023-07-28 NOTE — CM/SW NOTE
HCPOA order acknowledged. Addressed by Spiritual Care per protocol      / to remain available for support and/or discharge planning.      Poncho Thomas MBA MSN, RN CTL/  [id number]

## 2023-07-28 NOTE — PHYSICAL THERAPY NOTE
Per RN pt ok to be seen. Attempted to see pt for PT services. Pt received in bed with HOB elevated with labored breathing and CPAP. Pt wife and daughter at bedside. Pt reports he does not feel like he can participate in therapy today due to SOB and weakness Pt wife (who is a PT) states she just transferred pt back to bed and can mobilize pt at a later time and is in agreement with hold on PT today per pt request.   RN informed of above and PT to continue to follow pt as appropriate.

## 2023-07-28 NOTE — PROGRESS NOTES
07/28/23 0849   Clinical Encounter Type   Visited With Health care provider;Patient and family together   Routine Visit   (Responded to consult received)   Referral To   (Eucharistic  for [facility] [facility])   Worship Encounters   Spiritual Requests During Visit / Hospitalization [employer]   Family Spiritual Encounters   Family Coping   (spouse was present)   Taxonomy   Intended Effects Promote a sense of peace   Methods Encourage self reflection;Encourage self care;Encourage sharing of feelings; Offer spiritual/Jainism support; Offer emotional support   Interventions Acknowledge current situation; Active listening; Share words of hope and inspiration;Silent prayer; Identify supportive relationship(s)     Spiritual Care support can be requested via an Epic consult. For urgent/immediate needs, please contact the On Call  at ext. [phone]. Rev. Viji Ragsdale M.Div., M.T.S., [facility] CGCS.   Spiritual Care Lead

## 2023-07-28 NOTE — PROGRESS NOTES
Received patient alert and oriented x4. Medications given per MAR. Patient requested neb treatment. Notified MD of hgb 6.7. Unit of blood ordered. Transfusion started. Fever of 100.7. Stopped transfusion. Notified MD. Aleshia Vasquez to give tylenol and resume transfusion. Safety precautions in place. Call light within reach.

## 2023-07-29 NOTE — PLAN OF CARE
Assumed pt care at [address] [place] [address]. Patient receiving unit of PRBCs and tolerating well. Alert and oriented x4. VSS, afebrile. Normal sinus rhythm/ sinus tach on telemetry. Patient does get short of breath and appears to be gasping for air with small movements and transfers, recovers well. Due to patient's frequency of prn neb treatment requests respiratory doing scheduled nebs. Denies nausea. Family at the bedside. IVF and IV antibiotics per MAR. Patient appears to be resting comfortably throughout the night. Hemoglobin redraw 7.8. Fall precautions in place. Call light in reach. Problem: CARDIOVASCULAR - ADULT  Goal: Maintains optimal cardiac output and hemodynamic stability  Description: INTERVENTIONS:  - Monitor vital signs, rhythm, and trends  - Monitor for bleeding, hypotension and signs of decreased cardiac output  - Evaluate effectiveness of vasoactive medications to optimize hemodynamic stability  - Monitor arterial and/or venous puncture sites for bleeding and/or hematoma  - Assess quality of pulses, skin color and temperature  - Assess for signs of decreased coronary artery perfusion - ex.  Angina  - Evaluate fluid balance, assess for edema, trend weights  Outcome: Progressing  Goal: Absence of cardiac arrhythmias or at baseline  Description: INTERVENTIONS:  - Continuous cardiac monitoring, monitor vital signs, obtain 12 lead EKG if indicated  - Evaluate effectiveness of antiarrhythmic and heart rate control medications as ordered  - Initiate emergency measures for life threatening arrhythmias  - Monitor electrolytes and administer replacement therapy as ordered  Outcome: Progressing     Problem: RISK FOR INFECTION - ADULT  Goal: Absence of fever/infection during anticipated neutropenic period  Description: INTERVENTIONS  - Monitor WBC  - Administer growth factors as ordered  - Implement neutropenic guidelines  Outcome: Progressing     Problem: SAFETY ADULT - FALL  Goal: Free from fall injury  Description: INTERVENTIONS:  - Assess pt frequently for physical needs  - Identify cognitive and physical deficits and behaviors that affect risk of falls.   - [place] fall precautions as indicated by assessment.  - Educate pt/family on patient safety including physical limitations  - Instruct pt to call for assistance with activity based on assessment  - Modify environment to reduce risk of injury  - Provide assistive devices as appropriate  - Consider OT/PT consult to assist with strengthening/mobility  - Encourage toileting schedule  Outcome: Progressing

## 2023-07-29 NOTE — SLP NOTE
ADULT SWALLOWING EVALUATION    ASSESSMENT    ASSESSMENT/OVERALL IMPRESSION:  Pt seen for BSSE. Pt not seen by our service previously. Pt with lung mets, admitted with hepatocellular Ca, tachycardia; SOB/fatigue also present. Pt's RN stated that pt appeared to demonstrate difficulty with swallowing, wheezing, dry cough. Pt received in bed, awake, family at bedside. Pt stated that he is tired of having to cut up his food, but that is the only way he can eat it. Pt given solids and thins. Pt with good acceptance, containment and timeliness with all consistencies. Pt with no s/s of aspiration with all consistencies assessed. Recommend soft-bite/sized consistency for the pt to trial to see if it makes it easier so he does not have to cut up the food himself. Pt is ok to upgrade to regular if the pt finds that the soft/bite-sized menu options do not offer enough variety. Explain to pt's RN as well. Pt ok to continue thin liquids. Continue regular oral care and monitor pulmonary status closely. If pulmonary status declines, discontinue diet consistency. Speech to continue to monitor. RECOMMENDATIONS   Diet Recommendations - Solids: Mechanical soft chopped/ Soft & Bite Sized  Diet Recommendations - Liquids: Thin Liquids                        Compensatory Strategies Recommended: Alternate consistencies; Slow rate; No straws  Aspiration Precautions: Upright position; Slow rate;Small bites and sips  Medication Administration Recommendations: No restrictions  Treatment Plan/Recommendations: Dysphagia therapy  Discharge Recommendations/Plan: Undetermined    HISTORY   MEDICAL HISTORY  Reason for Referral: R/O aspiration    Problem List  Principal Problem:    Hyponatremia  Active Problems:    Palliative care encounter    Hepatocellular carcinoma (HCC)    Anemia, unspecified type    Shortness of breath    Pleural effusion    Tachycardia    Counseling regarding advance care planning and goals of care      Past Medical History  Past Medical History:   Diagnosis Date    Asthma     Cancer ([facility] [address]     Dyslipidemia     Esophageal reflux     GERD     HYPERTENSION     KIDNEY STONE     [place]     Sleep apnea [place] 6-10-16    CPAP 7           Diet Prior to Admission: Regular; Thin liquids           SWALLOWING HISTORY  Current Diet Consistency: Regular; Thin liquids  Dysphagia History: As stated above  Imaging Results:   Impression   CONCLUSION:  Multiple new small nodular opacities in both lungs is concerning for the possibility of metastatic disease. CT recommended for further evaluation. Other infectious etiologies are not excluded. SUBJECTIVE       OBJECTIVE   ORAL MOTOR EXAMINATION  Dentition: Natural  Symmetry: Within Functional Limits  Strength: Within Functional Limits  Tone: Within Functional Limits  Range of Motion: Within Functional Limits  Rate of Motion: Within Functional Limits    Voice Quality: Clear  Respiratory Status: Supplemental O2  Consistencies Trialed: Thin liquids; Hard solid  Method of Presentation: Staff/Clinician assistance;Single sips  Patient Positioning: Upright    Oral Phase of Swallow: Within Functional Limits                      Pharyngeal Phase of Swallow: Within Functional Limits           (Please note: Silent aspiration cannot be evaluated clinically. Videofluoroscopic Swallow Study is required to rule-out silent aspiration.)    Esophageal Phase of Swallow: No complaints consistent with possible esophageal involvement                GOALS  Goal #1 The patient will tolerate soft/bite-sized consistency and thin liquids without overt signs or symptoms of aspiration with 100 % accuracy over 2-3 session(s). In Progress   Goal #2 The patient/family/caregiver will demonstrate understanding and implementation of aspiration precautions and swallow strategies independently over 2-3 session(s).     In Progress   Goal #3 Pt ok to upgrade back to regular consistency provided pulmonary status remains stable if pt finds limited food choices in soft/bite-sized category.   In Progress       FOLLOW UP  Treatment Plan/Recommendations: Dysphagia therapy     Follow Up Needed (Documentation Required): Yes  SLP Follow-up Date: 07/31/23    Thank you for your referral.   If you have any questions, please contact ANGUS Medina

## 2023-07-29 NOTE — PHYSICAL THERAPY NOTE
Attempted to see Pt this PM - RN Brianna Gregory aware of attempt. Writer arrived for planned PT session - timed with pain meds, but Pt is occupied with lunch tray. Will f/u later today if time permits, after all other patients are attempted per tentative schedule.

## 2023-07-29 NOTE — PLAN OF CARE
Pt A/O x4. VSS-2L NC. Afebrile. Pt denies pain throughout day. Medications admin per MAR. Expiratory wheezes heard upon auscultation, pt utilizes CPAP PRN. Pt to be seen by PT today. Pt seen by SLP today, to have soft, bite-sized diet after lunch. Pt had 1 occurrence of black/tarry stool, MD notified. GI consulted. Pt repositioned in bed. Fall and safety precautions in place. Call light within reach.

## 2023-07-30 ENCOUNTER — ANESTHESIA (OUTPATIENT)
Dept: ENDOSCOPY | Facility: HOSPITAL | Age: 53
DRG: 870 | End: 2023-07-30
Payer: COMMERCIAL

## 2023-07-30 ENCOUNTER — ANESTHESIA EVENT (OUTPATIENT)
Dept: ENDOSCOPY | Facility: HOSPITAL | Age: 53
DRG: 870 | End: 2023-07-30
Payer: COMMERCIAL

## 2023-07-30 RX ORDER — LIDOCAINE HYDROCHLORIDE 10 MG/ML
INJECTION, SOLUTION EPIDURAL; INFILTRATION; INTRACAUDAL; PERINEURAL AS NEEDED
Status: DISCONTINUED | OUTPATIENT
Start: 2023-07-30 | End: 2023-07-30 | Stop reason: SURG

## 2023-07-30 RX ADMIN — SODIUM CHLORIDE, SODIUM LACTATE, POTASSIUM CHLORIDE, CALCIUM CHLORIDE: 600; 310; 30; 20 INJECTION, SOLUTION INTRAVENOUS at 07:37:00

## 2023-07-30 RX ADMIN — SODIUM CHLORIDE, SODIUM LACTATE, POTASSIUM CHLORIDE, CALCIUM CHLORIDE: 600; 310; 30; 20 INJECTION, SOLUTION INTRAVENOUS at 07:50:00

## 2023-07-30 RX ADMIN — LIDOCAINE HYDROCHLORIDE 50 MG: 10 INJECTION, SOLUTION EPIDURAL; INFILTRATION; INTRACAUDAL; PERINEURAL at 07:41:00

## 2023-07-30 NOTE — PLAN OF CARE
Patient alert and oriented x4. Afebrile. On CPAP at night with sats above 95%. Dyspnea on exertion noted. PRN Morphine Soln given per [facility] with good relief. Breathing treatments per RT. Maintained on IV antibiotics, tolerated well. On Fluid restriction due to Hyponatremia. Hgb stable 7.6 s/p 1PRBC. Patient noted with dark tarry stool from previous shift, GI was consulted. Patient NPO post midnight in preparation for scheduled EGD today. Patient and wife voiced understanding on Plan of care. Consent signed, filed on chart. Fall precautions in place. Call light in reach. Needs attended. Wife at bedside overnight.      Problem: RESPIRATORY - ADULT  Goal: Achieves optimal ventilation and oxygenation  Description: INTERVENTIONS:  - Assess for changes in respiratory status  - Assess for changes in mentation and behavior  - Position to facilitate oxygenation and minimize respiratory effort  - Oxygen supplementation based on oxygen saturation or ABGs  - Provide Smoking Cessation handout, if applicable  - Encourage broncho-pulmonary hygiene including cough, deep breathe, Incentive Spirometry  - Assess the need for suctioning and perform as needed  - Assess and instruct to report SOB or any respiratory difficulty  - Respiratory Therapy support as indicated  - Manage/alleviate anxiety  - Monitor for signs/symptoms of CO2 retention  7/30/2023 0122 by Geeta Lugo RN  Outcome: Progressing  7/30/2023 0122 by Geeta Lugo RN  Outcome: Progressing     Problem: GASTROINTESTINAL - ADULT  Goal: Maintains or returns to baseline bowel function  Description: INTERVENTIONS:  - Assess bowel function  - Maintain adequate hydration with IV or PO as ordered and tolerated  - Evaluate effectiveness of GI medications  - Encourage mobilization and activity  - Obtain nutritional consult as needed  - Establish a toileting routine/schedule  - Consider collaborating with pharmacy to review patient's medication profile  Outcome: Progressing

## 2023-07-30 NOTE — ANESTHESIA POSTPROCEDURE EVALUATION
[facility] Patient Status:  Inpatient   Age/Gender 45year old male MRN [id number]   Location [address] [address] Attending Fransisco Obrien DO   Hosp Day # 5 PCP Henna Branham MD       Anesthesia Post-op Note    ESOPHAGOGASTRODUODENOSCOPY (EGD) WITH MAC    Procedure Summary       Date: 07/30/23 Room / Location: [facility] ENDOSCOPY 02 / 140[facility] ENDOSCOPY    Anesthesia Start: 5229 Anesthesia Stop: 9185    Procedure: ESOPHAGOGASTRODUODENOSCOPY (EGD) WITH MAC Diagnosis:       GI bleed      (hiatal hernia)    Surgeons: Sav Cohen MD Anesthesiologist: Tiffany Miguel MD    Anesthesia Type: MAC ASA Status: 3            Anesthesia Type: MAC    Vitals Value Taken Time   BP 88/63 07/30/23 0754   Temp  07/30/23 0759   Pulse 139 07/30/23 0753   Resp 23 07/30/23 0753   SpO2 96 % 07/30/23 0753   Vitals shown include unvalidated device data. Patient Location: Endoscopy    Anesthesia Type: MAC    Airway Patency: patent    Postop Pain Control: adequate    Mental Status: preanesthetic baseline    Nausea/Vomiting: none    Cardiopulmonary/Hydration status: Other: (Pt tachy 130s-140s post-op. HDS. Comfortable. Minimal change after metoprolol 2.5mg IV. EKG obtained, showing a-fib. Second dose metoprolol given and cardiology consulted. )    Complications: no apparent anesthesia related complications    Postop vital signs: stable    Dental Exam: Unchanged from Preop    Patient to be discharged from PACU when criteria met.

## 2023-07-30 NOTE — OPERATIVE REPORT
OPERATIVE REPORT   PATIENT NAME: Stephanie King  MRN: [id number]  DATE OF OPERATION: 7/30/2023  PREOPERATIVE DIAGNOSIS: melena, anemia with drop in Hg, hepatocellular carcinoma with lung metastasis  POSTOPERATIVE DIAGNOSIS:    1.  Small hiatal hernia    2. No obvious source of bleeding, ulcers  PROCEDURE PERFORMED: Esophagogastroduodenoscopy with 4th duodenum  SEDATION MEDICATIONS: MAC  MODERATE SEDATION TIME: None. Deep sedation provided by anesthesia  PREPROCEDURE ASSESSMENT: The indication for this procedure is to assess for source of bleeding. The patient was identified by myself and nursing staff in the exam room. Informed consent was obtained. The patient was seen in clinic and a full H&P was obtained. On brief physical examination, airway is patent. Chest is clear. Heart has regular rate and rhythm. Abdomen is soft, nontender with good bowel sounds. A medication list was taken by nursing today and reviewed by myself. The patient is an ASA grade 2. PROCEDURE NOTE: The procedure was completed without difficulty. The patient tolerated the procedure well. The endoscope was inserted through the mouth and advanced to the level of the duodenum, 3rd and 4th portion. Esophagus appeared normal without stricture or esophagitis. A small 2 cm hiatal hernia without Young's esophagus was noted. Stomach was normal in the antrum and the body. Duodenum was normal in the bulb and 2nd duodenum. Retroflexed view in the stomach revealed normal fundus and cardia. There were no immediate complications. FINDINGS   No source of bleeding seen in upper GI tract despite careful examination. May possibly have small bowel or proximal colonic bleeding. Patient is too unstable from respiratory point to have a colonoscopy or prolonged sedation. RECOMMENDATIONS: continue prophylactic IV protonix daily; avoid anticoagulation if possible.   DISCHARGE:  On discharge, the patient was given an after-visit summary detailing the procedure, findings, followup plans, and an updated medication list.     Thank you very much for the consultation. I really appreciate it.     Dilan Richmond MD

## 2023-07-30 NOTE — PLAN OF CARE
Pt A/O x4. Afebrile. Pt had EGD done this AM, HR irregular afterwards per kaela RN. EKG done, showing a. Fib RVR. HR between 120s-140s. Cards consulted. Pt returned to  on unit, pt HR a. Fib RVR on monitor. Pt resting in bed. Medications admin per MAR. Pt wearing CPAP, O2 sats between 94-95%. Pt seen by cards, to be placed on cardizem drip per order. Transfer order in place. Report given to [facility]. Pt and spouse made aware of transfer. Fall and safety precautions in place. Call light within reach.

## 2023-07-30 NOTE — RESPIRATORY THERAPY NOTE
RT called to give pt PRN breathing tx for SOB/chest pain. On arrival pt appears anxious Bilaterally diminished with exp wheezing. RN placed pt on CPAP with 6L. Pt still in A-fib on Cardizem with rates from 110-123. RR 24 SPO2 95%. After tx pt states he can not really tell a difference however, appears less labored with audible exp wheezes. RT discussed with pt and family that if breathing worsens Bipap is the next step to help with WOB.  RT informed RN and will continue to monitor

## 2023-07-31 NOTE — SLP NOTE
SLP visit attempted to monitor diet tolerance and adjust as appropriate. Patient currently requiring BiPAP support and not appropriate for PO trials. SLP will continue to monitor and re-attempt as medically appropriate. Discussed with RN.

## 2023-07-31 NOTE — PHYSICAL THERAPY NOTE
PT attempted to see the pt for treatment this pm, however per AARON ALNA, pt is not appropriate this pm as he is on continuous BIPAP and tachypneic. Will hold today and re-attempt when medically appropriate.

## 2023-07-31 NOTE — PLAN OF CARE
Assumed care 1930  Patient remains dyspneic  SCDs in place  Remains on 5-6 L CPAP  Family at  bedside  Cardizem education given, drip running, BP stable  Monitored frequently  Patient and family aware of POC  No BM this shift

## 2023-07-31 NOTE — PROGRESS NOTES
Assumed care of patient when transferred from Med/Onc in A-fib RVR. Cardizem gtt started per order once pt arrived to unit. HR a-fib 100's to 120 by end of shift with Cardizem gtt 15ml/hr. Spo2 maintained on 6L NC/CPAP. Pt and family updated on POC.

## 2023-08-01 PROBLEM — Z71.89 GOALS OF CARE, COUNSELING/DISCUSSION: Status: ACTIVE | Noted: 2023-01-01

## 2023-08-01 NOTE — RESPIRATORY THERAPY NOTE
Patient received on BiPAP 12/5 50%; patient lethargic, ABG drawn showing metabolic acidosis. Transported from [address] [place] [address] to [address] [place] [address] without incident. Patient tachypneic with tidal volumes ~700mL. Tolerating BiPAP well, receiving DuoNeb Q6.      Latest Reference Range & Units 08/01/23 11:02   ABG PH 7.35 - 7.45  7.20 (LL)   ABG PCO2 35 - 45 mm Hg 35   ABG PO2 80 - 100 mm Hg 80   ABG HCO3 21.0 - 27.0 mEq/L 14.3 (L)   ABG BASE EXCESS -2.0 - 2.0 mmol/L -13.4 (L)   POTASSIUM BLOOD GAS 3.6 - 5.1 mmol/L 5.2 (H)   SODIUM BLOOD  - 145 mmol/L 132 (L)   Lactic Acid (Blood Gas) 0.5 - 2.0 mmol/L 1.1   PATIENT TEMPERATURE F 98.6   FiO2 % 50   IONIZED CALCIUM 0.95 - 1.32 mmol/L 1.23   SAMPLE SITE  Left Radial   ARTERIAL BLOOD GAS DEVICE  Bi-PAP   INSP PRESSURE cm H2O 12.0   EXPIRATORY PRESSURE cm H2O 5.0   (LL): Data is critically low  (L): Data is abnormally low  (H): Data is abnormally high

## 2023-08-01 NOTE — PROCEDURES
[address] \[facility]\"" Patient Status:  Inpatient    10/11/1970 MRN [id number]   [place] 4SW-A Attending Dennie Goldstein, MD   [facility] Day # 7 PCP Sana Perez MD     Intubation    Indications:  Respiratory failure and Airway protection    Procedure Details:  Intubation Method:  Video-assisted  Patient Status:  Paralyzed (RSI)  Preoxygenation:  BVM    Anesthesia:   Pretreatment meds:  None    Sedation:      Sedation:  Etomidate  Paralytic:  Succinylcholine  Laryngoscope Size: Mac 3  Tube Size (mm):  7.5  Tube Type:  Cuffed  Number of Attempts:  1  Ventilation between attempts:   NA  Cricoid Pressure:  no  Cords Visualized:  yes  Post-Procedure Assessment:  Chest Rise and ETCO2 Monitor  Breath Sounds:  Equal  Cuff inflated? yes  ETT to lip (cm): 22  ETT to teeth (cm):  21  Tube secured with:  ETT herron    CXR ordered    Patient tolerance:  Patient tolerated the procedure well with no immediate complications.

## 2023-08-01 NOTE — OPERATIVE REPORT
Bronchoscopy procedure report    Preop diagnosis: Abnl CT chest  Postop diagnosis:  Abnl CT chest  Procedure performed: Bronchoscopy, Diagnostic  Bronchoalveolar lavage, BAL    Sedation used: Propofol gtt and Fentanyl gtt- see nurse charting along w/ 50mcg bolus of fentanyl  Moderate Sedation Time: 15 minutes    Description of procedure: Informed consent was obtained. Oxygen applied via ET tube. Bronchoscope was inserted via ET tube route. Normal vocal cord movement was not noted. Trachea appeared short. Roxann Lat appeared sharp and narrow. Mucous membranes appeared pink and somewhat edematous. There were minimal thin secretions noted in the airways    Left lung was inspected. .  Right lung was inspected. .    Lesions seen: none          Samples obtained:    BAL of RML 120cc were instilled and 20cc were aspirated and sent for cytology and culture        Post procedure CXR necessary? no  Follow up:  inpatient    Patient tolerated the procedure well and was transferred to the recovery area. EBL was <5cc. I was present and supervised the independent trained observer as they monitored the patient's level of consciousness and physiological status during the entire course of the procedure and moderate sedation.

## 2023-08-01 NOTE — PROGRESS NOTES
Infectious Disease Progress Note      Date of admission: 7/24/2023  9:58 PM     Date of service: 08/01/23 10:15 AM    Reason for consult: Pneumonia    Subjective: Continues to be on BiPAP and mild to moderate respiratory distress. No other complaints. 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 CT angiogram of the chest without pneumonia or PE. Trace pleural effusion with atelectasis noted on the right side. CT abdomen pelvis with known liver masses, with question of gluteal cellulitis. Currently on IV cefepime  Course complicated by GI bleed, EGD without any acute abnormalities. Unable to undergo colonoscopy due to respiratory status. Chest imaging on 7/31 with worsening bilateral upper lobe infiltrates, infectious versus related to his cancer therapy. Medications:    iron sucrose    methylPREDNISolone    dilTIAZem ER    morphINE    meropenem    pantoprazole    dilTIAZem    sodium chloride    ipratropium-albuterol    multivitamin with minerals    fluticasone furoate-vilanterol    acetaminophen    polyethylene glycol (PEG 3350)    sennosides    bisacodyl    ondansetron    prochlorperazine    guaiFENesin ER     Allergies:    Ibuprofen               HIVES    Comment:Per pt. Had been using ibuprofen with no issues  Penicillins             RASH    Physical Exam:   08/01/23  0746   BP: 120/67   Pulse: 75   Resp: 23   Temp:      Vitals signs and nursing note reviewed. Constitutional:       Appearance: Moderate distress appearance. HENT:      Head: Normocephalic and atraumatic. Mouth/Throat: Normal dentition     Mouth: Mucous membranes are moist.  On CPAP  Neck:      Musculoskeletal: Neck supple. Cardiovascular:      Rate and Rhythm: Normal rate. Heart sounds: Normal heart sounds. No murmur. No friction rub. No gallop. Pulmonary:      Effort: Pulmonary effort is normal. No respiratory distress. Breath sounds: Normal breath sounds. Bilateral rhonchi   Chest:      Chest wall: No tenderness. Abdominal:      General: Abdomen is flat. There is no distension. Palpations: Abdomen is soft. There is no mass. Tenderness: There is no tenderness. There is no guarding or rebound. Hernia: No hernia is present. Musculoskeletal:      Right lower leg: No edema. Left lower leg: No edema. Skin:     General: Skin is warm and dry. Laboratory data:  I have reviewed all the lab results independently. Lab Results   Component Value Date    WBC 14.7 08/01/2023    HGB 7.4 08/01/2023    HCT 23.5 08/01/2023    .0 08/01/2023    CREATSERUM 1.12 08/01/2023    CREATSERUM 1.12 08/01/2023    BUN 79 08/01/2023    BUN 79 08/01/2023     08/01/2023     08/01/2023    K 4.7 08/01/2023    K 4.7 08/01/2023     08/01/2023     08/01/2023    CO2 15.0 08/01/2023    CO2 15.0 08/01/2023     08/01/2023     08/01/2023    CA 8.9 08/01/2023    CA 8.9 08/01/2023    ALB 2.1 08/01/2023    ALB 2.1 08/01/2023    ALKPHO 230 08/01/2023    BILT 0.6 08/01/2023    TP 7.0 08/01/2023    AST 95 08/01/2023     08/01/2023    MG 3.1 08/01/2023    PHOS 5.3 08/01/2023      Recent Labs   Lab 08/01/23  0521   RBC 2.67*   HGB 7.4*   HCT 23.5*   MCV 88.0   MCH 27.7   MCHC 31.5   RDW 17.9   NEPRELIM 11.75*   WBC 14.7*   .0      Microbiology data:  Hospital Encounter on 07/24/23   1. Blood Culture     Status: None    Collection Time: 07/24/23 10:13 PM    Specimen: Blood,peripheral   Result Value Ref Range    Blood Culture Result No Growth 5 Days N/A        Radiology:  I have reviewed all imagining data available independently.    CT chest:  Bilateral upper lobe pneumonitis    Impression:  Andie Arroyo is a 45year old male with    Multifocal bilateral pneumonitis with worsening infiltrates seen on CT done on 7/31 with acute hypoxic respiratory failure with threat to life  Patient escalated to IV meropenem on 7/31 due to worsening infiltrates  Other considerations would include PJP pneumonia and fungal pneumonias  Patient had a negative PPD and quant gold in the past, unlikely TB  Ideally, he would need a bronchoscopy with BAL; however, given his respiratory status, he will need to be intubated  Another concern would be related to his other concern is that could be related to his cancer therapy, Tecentriq and Avastin  Hepatocellular carcinoma with metastases  Was on Tecentriq and Avastin as an outpatient    Recommendations:    Continue IV meropenem  Check beta D glucan  Check histoplasma urine antigen  Check fungal survey  Check serum crypto antigen  Start full dose Bactrim 5 mg/kg every 8 for empiric PJP coverage  Start liposomal amphotericin B, pharmacy to dose  Continue IV meropenem  If the patient gets intubated, then would recommend obtaining a bronchoscopy with BAL's for Gram stain and culture, AFB stains and cultures, fungal stains and cultures, Aspergillus galactomannan, Legionella cultures. Further recommendations will depend on clinical progress    The plan of care was discussed with the primary hospital team, Renu Rosales MD     Recommendations were also discussed with the patient; all questions were answered. Thank you for this consultation. Please don't hesitate to call the ID team for questions or any acute changes in patient's clinical condition. Please note that this report has been produced using speech recognition software and may contain errors related to that system including, but not limited to, errors in grammar, punctuation, and spelling, as well as words and phrases that possibly may have been recognized inappropriately.   If there are any questions or concerns, contact the dictating provider for clarification. The [facility] makes medical notes like these available to patients in the interest of transparency. Please be advised this is a medical document. Medical documents are intended to carry relevant information, facts as evident, and the clinical opinion of the practitioner. The medical note is intended as peer to peer communication and may appear blunt or direct. It is written in medical language and may contain abbreviations or verbiage that are unfamiliar. Ubaldo Cox MD  [facility]. Tel: [phone]. Fax: [phone].      Roger Hatfield : 10/11/1970 MRN: [id number] CSN: [id number]

## 2023-08-01 NOTE — PHYSICAL THERAPY NOTE
Attempted to see pt for PT. Per chart review pt remain on BiPAP. RN confirmed. Will hold session. Addendum: patient was transferred to ICU.

## 2023-08-01 NOTE — PLAN OF CARE
Assumed patient care approximately 1930  Patient lethargic, orientated x4, somewhat agitated intermittently  Afebrile. Remains on continuous BiPAP overnight, dyspneic at rest   Duonebs per respiratory  BP within parameters; HR in the 60s overnight; NSR on the monitor  SCDs remain in place, patient tolerating  Family at bedside, wife stayed overnight assisting with care  Difficulty with urination, multiple attempts with urinal. Will complete bladder scan if no urine output  No BM overnight  No bleeding noted  Plan of care reviewed. All questions answered  Frequent repositioning  IV abx infused as ordered  Pain controlled with prn Morphine  All safety precautions maintained    Addendum- Bladder scan 118, no urine output. Increasing Bilateral extremity edema  Endorsed to day shift RNs    Problem: CARDIOVASCULAR - ADULT  Goal: Maintains optimal cardiac output and hemodynamic stability  Description: INTERVENTIONS:  - Monitor vital signs, rhythm, and trends  - Monitor for bleeding, hypotension and signs of decreased cardiac output  - Evaluate effectiveness of vasoactive medications to optimize hemodynamic stability  - Monitor arterial and/or venous puncture sites for bleeding and/or hematoma  - Assess quality of pulses, skin color and temperature  - Assess for signs of decreased coronary artery perfusion - ex.  Angina  - Evaluate fluid balance, assess for edema, trend weights  Outcome: Progressing     Problem: CARDIOVASCULAR - ADULT  Goal: Absence of cardiac arrhythmias or at baseline  Description: INTERVENTIONS:  - Continuous cardiac monitoring, monitor vital signs, obtain 12 lead EKG if indicated  - Evaluate effectiveness of antiarrhythmic and heart rate control medications as ordered  - Initiate emergency measures for life threatening arrhythmias  - Monitor electrolytes and administer replacement therapy as ordered  Outcome: Progressing     Problem: RESPIRATORY - ADULT  Goal: Achieves optimal ventilation and oxygenation  Description: INTERVENTIONS:  - Assess for changes in respiratory status  - Assess for changes in mentation and behavior  - Position to facilitate oxygenation and minimize respiratory effort  - Oxygen supplementation based on oxygen saturation or ABGs  - Provide Smoking Cessation handout, if applicable  - Encourage broncho-pulmonary hygiene including cough, deep breathe, Incentive Spirometry  - Assess the need for suctioning and perform as needed  - Assess and instruct to report SOB or any respiratory difficulty  - Respiratory Therapy support as indicated  - Manage/alleviate anxiety  - Monitor for signs/symptoms of CO2 retention  Outcome: Progressing     Problem: GASTROINTESTINAL - ADULT  Goal: Maintains or returns to baseline bowel function  Description: INTERVENTIONS:  - Assess bowel function  - Maintain adequate hydration with IV or PO as ordered and tolerated  - Evaluate effectiveness of GI medications  - Encourage mobilization and activity  - Obtain nutritional consult as needed  - Establish a toileting routine/schedule  - Consider collaborating with pharmacy to review patient's medication profile  Outcome: Progressing

## 2023-08-01 NOTE — PROGRESS NOTES
[facility] Pharmacy Note:  Renal Dose Adjustment for Meropenem (Nonah Click)    Solo Reid is a 45year old patient who has been prescribed meropenem 500 mg every 8 hrs for treatment of pneumonia. The patient is not requiring vasopressor support. The patient has an unknown history of infection or colonization with P. aeruginosa or A. baumannii. P. aeruginosa and/or A. baumannii have not been isolated on culture. The estimated creatinine clearance is 60.7 mL/min (based on SCr of 1.12 mg/dL). .    The dose has been adjusted to meropenem 1000 mg  every 8 hrs per hospital renal dose adjustment protocol. For treatment of critically ill pt (moving to ICU)    Pharmacy will follow and adjust dose as warranted for additional indication and/or renal function changes.     Thank you,    Renetta Gomez PharmD  8/1/2023  11:18 AM

## 2023-08-01 NOTE — PROGRESS NOTES
Assumed care of pt at 0700. Echo completed. CXR completed- CT chest follow up completed. IV abx switched to meropenem. Continuous bipap initiated at 1200. Cardizem gtt stopped and transitions to PO- HR controlled.

## 2023-08-01 NOTE — PROGRESS NOTES
Assumed care of patient at 0700. Patient alert and oriented x 4. Patient on bipap. Bipap removed and switched to [facility] for patient to take medications and eat. Patient took medications whole with applesauce and Ensure. Once patient finished eating transitioned back to bipap. Chapa catheter inserted due to urine retention and hemodynamically unstable. Patient to transfer to ICU per orders from Dr. Norberto Le. This RN called report to ICU RN. Patient transferred to ICU. Patient transported in bed with cardiac monitor and pulse ox in place as well as bipap. Transferred with RN and respiratory tech present.

## 2023-08-02 NOTE — PROGRESS NOTES
08/02/23 1714   Clinical Encounter Type   Visited With Patient and family together   Continue Visiting No   Patient's Supportive Strategies/Resources Patient was sedated. Wife Zacarias Groves has strong jenni and family support (many were present)   Referral From    Referral To    Anabaptist Encounters   Anabaptist Needs Prayer   Sacramental Encounters   Sacrament of Sick-Anointing Visiting  anointed patient  (Anointing occurred today.  notified them that further anointings would not be necessary during this hospital stay. He also explained what anointing is and what  prays for during the rite.)   Family Spiritual Encounters   Family Coping Sadness;Open/discussion  (about Tea)   Taxonomy   Intended Effects Jenni affirmation   Methods Demonstrate acceptance; Offer emotional support;Explore nature of God;Explore presence of God;Explore quality of life   Interventions Active listening; Ask guided questions about cultural and Gnosticist values; Discuss concerns; Share written prayer;Provde spiritual/Gnosticist resources; Share words of hope and inspiration      initiated relationship with wife Zacarias Groves, two children, and friends, providing emotional support as Zacarias Groves shared Pedro's health challenges since July, as well as a bit of their life (22th wedding anniversary will be in 2025). Jenni and Christianity doctrine were discussed, and specific prayers provided. Zacarias Groves tearfully said she is hoping for a miracle, but will resign to Tea if that is what is best for Pedro. Spiritual Care support can be requested via an Epic consult.

## 2023-08-02 NOTE — RESPIRATORY THERAPY NOTE
404 \[facility] [place] [facility]\"" Patient Status:  Inpatient    10/11/1970 MRN [id number]   [facility] 4SW-A Attending Denise Joseph MD   [facility] Day # 8 PCP Chani Iyre MD     Arterial Line Placement    Preparation:  Patient was prepped and draped in usual sterile fashion. Indications:  Multiple ABGs and Hemodynamic Monitoring    Location:  Right radial    Anesthesia: none  Local anesthetic:  none    Sedation:  Patient Sedated? yes    Procedure Details  Albino's test normal?  yes  Needle gauge:  20  Seldinger technique:  yes  Cutdown:  no  Number of attempts:  1    Post Procedure  Post procedure:  Dressing applied  Post-procedure CMS:  normal  Patient tolerance:  Patient tolerated the procedure well with no immediate complications.

## 2023-08-02 NOTE — BRIEF PROCEDURE NOTE
Bronchoscopy procedure report    Preop diagnosis: Abnl CT chest  Postop diagnosis:  Abnl CT chest  Procedure performed: Bronchoscopy, Diagnostic  Bronchoalveolar lavage, BAL    Sedation used: Propofol gtt and fentanyly gtt w/ 50mcg fentanyl bolus  Moderate Sedation Time: 15 minute    Description of procedure: Informed consent was obtained. Oxygen applied via ET tube. Bronchoscope was inserted via ET tube route. Normal vocal cord movement was not noted. Trachea appeared short. Tina Friendly appeared sharp and narrow. Mucous membranes appeared pinl and edematous. There were minimal thin secretions noted in the lower airways     Left lung was inspected. .  Right lung was inspected. .    Lesions seen: none          Samples obtained:    BAL of RML- of 120cc instilled 20 were aspirated and sent for culture and cytology       Post procedure CXR necessary? no  Follow up:  inpatient    Patient tolerated the procedure well and was transferred to the recovery area. EBL was <5cc. I was present and supervised the independent trained observer as they monitored the patient's level of consciousness and physiological status during the entire course of the procedure and moderate sedation.

## 2023-08-02 NOTE — PROGRESS NOTES
[facility] Pharmacy Note:  Renal Adjustment for meropenem (MERREM)    Andie Winchester is a 45year old patient who has been prescribed meropenem (MERREM) 1000 mg every 8 hrs. The estimated creatinine clearance is 39.5 mL/min (A) (based on SCr of 1.72 mg/dL (H)). The dose has been adjusted to meropenem (MERREM) 1000 mg every 12 hrs per hospital renal dose adjustment protocol for treatment of pneumonia. Pharmacy will follow and adjust dose as warranted for additional renal function changes.     Thank you,    Kristie Hernandez, PharmD  8/2/2023  1:28 PM

## 2023-08-02 NOTE — PLAN OF CARE
Assumed care of pt at approximately 1930. Received pt sedated,resting in bed intubated on ventilator, propofol, fentanyl, levophed gtt infusing. Levophed titrated to pt specific BP goals. Esophageal temp probe intact. T max 99.1. OG intact to LIS. Chapa intact.   See flowsheet

## 2023-08-02 NOTE — PLAN OF CARE
Received pt from CTU7 around 1130 somnolent on BIPAP with increased WOB. Arousable to speech, able to follow simple commands. Intubated by intensivist. Propofol and fentanyl started for sedation. OG placed to LIS with stool appearing output; GI notified. KUB obtained. ABGs obtained, intensivist notified of results. Bedside bronch performed by intensivist. Pt hypotensive; levo started per order. Pt hypothermic initially; raiza hugger applied, now removed. Chapa intact, monitoring urine output. VSS. NSR on monitor. Family at bedside and updated on plan of care. See flowsheets. See MAR. Will continue to monitor.

## 2023-08-02 NOTE — PROGRESS NOTES
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 CT angiogram of the chest without pneumonia or PE. Trace pleural effusion with atelectasis noted on the right side. CT abdomen pelvis with known liver masses, with question of gluteal cellulitis. Currently on IV cefepime  Course complicated by GI bleed, EGD without any acute abnormalities. Chest imaging on 7/31 with worsening bilateral upper lobe infiltrates, infectious versus related to his cancer therapy. Intubated on 8/1, now status post bronchoscopy. Microbiological data pending    Medications:    sodium bicarbonate in D5W infusion    sodium chloride    furosemide    atovaquone    ipratropium-albuterol    iron sucrose    methylPREDNISolone    dextrose in water **AND** amphotericin b liposomal **AND** dextrose in water    meropenem    fentaNYL **OR** fentaNYL    acetaminophen **OR** acetaminophen **OR** acetaminophen **OR** acetaminophen    fentaNYL (Sublimaze) **OR** fentaNYL (Sublimaze)    fentanyl    senna    bisacodyl    fleet enema    chlorhexidine gluconate    midazolam    dexmedetomidine    propofol    metoprolol    norepinephrine    vasopressin (Vasostrict) 20 Units in sodium chloride 0.9% 100 mL infusion for septic shock    phenylephrine    [Held by provider] dilTIAZem ER    morphINE    pantoprazole    dilTIAZem    sodium chloride    multivitamin with minerals    fluticasone furoate-vilanterol    acetaminophen    polyethylene glycol (PEG 3350)    ondansetron    prochlorperazine    guaiFENesin ER     Allergies:    Ibuprofen               HIVES    Comment:Per pt.  Had been using ibuprofen with no issues  Penicillins             RASH    Physical Exam:   08/02/23  0725   BP:    Pulse: 72   Resp: 23   Temp:      Vitals signs and nursing note reviewed. Constitutional:       Appearance: Critically ill appearance. Intubated  HENT:      Head: Normocephalic and atraumatic. Mouth/Throat: Intubated     Mouth: Mucous membranes are moist.   Neck:      Musculoskeletal: Neck supple. Cardiovascular:      Rate and Rhythm: Normal rate. Heart sounds: Normal heart sounds. No murmur. No friction rub. No gallop. Pulmonary:      Effort: Pulmonary effort is ventilated. No respiratory distress. Breath sounds: Scattered bilateral rhonchi  Chest:      Chest wall: No tenderness. Abdominal:      General: Abdomen is flat. There is no distension. Palpations: Abdomen is soft. There is no mass. Tenderness: There is no tenderness. There is no guarding or rebound. Hernia: No hernia is present. Musculoskeletal:      Right lower leg: No edema. Left lower leg: No edema. Skin:     General: Skin is warm and dry. Neurological:      General: Sedated    Laboratory data:  I have reviewed all the lab results independently.   Lab Results   Component Value Date    WBC 16.9 08/02/2023    HGB 7.3 08/02/2023    HCT 23.5 08/02/2023    .0 08/02/2023    CREATSERUM 1.72 08/02/2023    BUN 86 08/02/2023     08/02/2023    K 5.4 08/02/2023     08/02/2023    CO2 13.0 08/02/2023     08/02/2023    CA 8.2 08/02/2023    ALB 1.9 08/02/2023    ALKPHO 228 08/01/2023    BILT 0.8 08/01/2023    TP 6.8 08/01/2023    AST 96 08/01/2023     08/01/2023    PTT 47.9 08/02/2023    INR 1.57 08/02/2023    MG 3.0 08/02/2023    PHOS 4.9 08/02/2023      Recent Labs   Lab 08/02/23  0415   RBC 2.68*   HGB 7.3*   HCT 23.5*   MCV 87.7   MCH 27.2   MCHC 31.1   RDW 18.4   NEPRELIM 14.43*   WBC 16.9*   .0*   NEUT 93   LYMPH 3   MON 2   EOS 0   NRBC 3*      Microbiology data:  Hospital Encounter on 07/24/23   1. Blood Culture     Status: None    Collection Time: 07/24/23 10:13 PM    Specimen: Blood,peripheral   Result Value Ref Range    Blood Culture Result No Growth 5 Days N/A        Radiology:  I have reviewed all imagining data available independently. Chest x-ray:  Stable bilateral infiltrates    Impression:  Laura Riddle is a 45year old male with    Multifocal bilateral pneumonitis with worsening infiltrates seen on CT done on 7/31 with acute hypoxic respiratory failure with threat to life  Patient escalated to IV meropenem on 7/31 due to worsening infiltrates  Started on amphotericin B and Bactrim on 8/1 due to concerns for portion of stick infection  Intubated on 8/1, bronchoscopy done on the same day. Microbiological data is pending  Other considerations would include PJP pneumonia and fungal pneumonias  Patient had a negative PPD and quant gold in the past, unlikely TB  Another concern would be related to his other concern is that could be related to his cancer therapy, Tecentriq and Avastin  Hepatocellular carcinoma with metastases  Was on Tecentriq and Avastin as an outpatient  Worsening acute renal failure with hyperkalemia  Concern for Bactrim toxicity    Recommendations:    Discontinue IV Bactrim given his hyperkalemia  Start atovaquone 750 mg twice daily  Continue liposomal amphotericin B, pharmacy to dose  Continue IV meropenem  Continue to follow-up on his microbiological data  Supportive care as per critical care team  Further recommendations will depend clinical progress    The plan of care was discussed with the caring hospital teams, Dr Radha Lucia and Dr Author Erickson and Dr Ewelina Mathis     Thank you for this consultation. Please don't hesitate to call the ID team for questions or any acute changes in patient's clinical condition.     Please note that this report has been produced using speech recognition software and may contain errors related to that system including, but not limited to, errors in grammar, punctuation, and spelling, as well as words and phrases that possibly may have been recognized inappropriately. If there are any questions or concerns, contact the dictating provider for clarification. The [facility] makes medical notes like these available to patients in the interest of transparency. Please be advised this is a medical document. Medical documents are intended to carry relevant information, facts as evident, and the clinical opinion of the practitioner. The medical note is intended as peer to peer communication and may appear blunt or direct. It is written in medical language and may contain abbreviations or verbiage that are unfamiliar. Scar Dillard MD  [facility]. Tel: [phone]. Fax: [phone].      Hazel Flor : 10/11/1970 MRN: [id number] [facility]: [id number]

## 2023-08-03 NOTE — PLAN OF CARE
Problem: CARDIOVASCULAR - ADULT  Goal: Maintains optimal cardiac output and hemodynamic stability  Description: INTERVENTIONS:  - Monitor vital signs, rhythm, and trends  - Monitor for bleeding, hypotension and signs of decreased cardiac output  - Evaluate effectiveness of vasoactive medications to optimize hemodynamic stability  - Monitor arterial and/or venous puncture sites for bleeding and/or hematoma  - Assess quality of pulses, skin color and temperature  - Assess for signs of decreased coronary artery perfusion - ex. Angina  - Evaluate fluid balance, assess for edema, trend weights  Outcome: Progressing  Goal: Absence of cardiac arrhythmias or at baseline  Description: INTERVENTIONS:  - Continuous cardiac monitoring, monitor vital signs, obtain 12 lead EKG if indicated  - Evaluate effectiveness of antiarrhythmic and heart rate control medications as ordered  - Initiate emergency measures for life threatening arrhythmias  - Monitor electrolytes and administer replacement therapy as ordered  Outcome: Progressing     Problem: SAFETY ADULT - FALL  Goal: Free from fall injury  Description: INTERVENTIONS:  - Assess pt frequently for physical needs  - Identify cognitive and physical deficits and behaviors that affect risk of falls.   - [place] fall precautions as indicated by assessment.  - Educate pt/family on patient safety including physical limitations  - Instruct pt to call for assistance with activity based on assessment  - Modify environment to reduce risk of injury  - Provide assistive devices as appropriate  - Consider OT/PT consult to assist with strengthening/mobility  - Encourage toileting schedule  Outcome: Progressing     Problem: RESPIRATORY - ADULT  Goal: Achieves optimal ventilation and oxygenation  Description: INTERVENTIONS:  - Assess for changes in respiratory status  - Assess for changes in mentation and behavior  - Position to facilitate oxygenation and minimize respiratory effort  - Oxygen supplementation based on oxygen saturation or ABGs  - Provide Smoking Cessation handout, if applicable  - Encourage broncho-pulmonary hygiene including cough, deep breathe, Incentive Spirometry  - Assess the need for suctioning and perform as needed  - Assess and instruct to report SOB or any respiratory difficulty  - Respiratory Therapy support as indicated  - Manage/alleviate anxiety  - Monitor for signs/symptoms of CO2 retention  Outcome: Progressing     Problem: Safety Risk - Non-Violent Restraints  Goal: Patient will remain free from self-harm  Description: INTERVENTIONS:  - Apply the least restrictive restraint to prevent harm  - Notify patient and family of reasons restraints applied  - Assess for any contributing factors to confusion (electrolyte disturbances, delirium, medications)  - Discontinue any unnecessary medical devices as soon as possible  - Assess the patient's physical comfort, circulation, skin condition, hydration, nutrition and elimination needs   - Reorient and redirection as needed  - Assess for the need to continue restraints  Outcome: Progressing

## 2023-08-03 NOTE — PHYSICAL THERAPY NOTE
Physical Therapy    Pt was transferred to ICU on 8/1 from CTU. Pt remains vented, chart reviewed and RN confirmed. Will follow up for therapy when clinically stable for mobility training.

## 2023-08-03 NOTE — PLAN OF CARE
Received pt at change of shift. Pt intubated and sedated 60% +5. Pt on prop/fent for sedation. Vaso/bicarb gtt remain infusing. Chapa intact. Juliet/vigileo in place. Pt unable to follow commands. However, does withdraw to pain. Moderate tan/pink tinged secretions. Albumin/lasix ordered per ICU. See flowsheets for I&O's. Will continue to monitor. See MAR/flowsheets for additional information.

## 2023-08-03 NOTE — PLAN OF CARE
Received pt sedated with propofol/fentanyl on ventilator with levo infusing. Withdraws to pain in BUE, grimaces with suctioning and oral care. +cough/gag/corneals. Asynchronous with ventilator at times; PRN fentanyl and PRN versed given per order. ABG obtained; intensivist notified of results. Bicarb gtt started. Renal and intensivist notified of RFP results; bicarb gtt rate increased per order. A-line placed by respiratory. Levo titrated and vaso added per order. 1u PRBCs transfused per order and lasix given. OG intact. TF started per order. Chapa intact, low urine output. VSS. NSR on monitor. Family at bedside and updated on plan of care. See flowsheets. See MAR. Will continue to monitor.

## 2023-08-03 NOTE — PLAN OF CARE
Vital signs stable on vent, 60% FiO2. Pressor requirements decreasing (levo/vaso). Low grade fever. GCS 7. On sedation (propofol/fentanyl). Blood sugar uncontrolled, bicarb gtt switched from D5 to NS. Tolerated tube feeds well, residuals checked with rate increases. Small loose bowel movement this shift, mucoid brown. Chapa in place, urine output marginal but improving. Safety maintained.

## 2023-08-04 NOTE — PLAN OF CARE
Sedated to RASS -3 - does not respond to pain but does move eye lids when name is called, pupils are 3B. Decreased propofol and now note irregularly regular respiratory pattern but generally is synchronous with vent. #7.5/22, ACPC 24 Pi 25 0.6 +5, pip 30, initially with copious thick tan with slightly bloody sputum from back of throat - now less. OG at 65 cm with goal Nepro 1.8 at 35 mL/hr (water flushes on hold with hyponatremia), no BM yet, abdomen firm and distended, residual checked earlier (minimal). 1+ edema to extremities. Chapa with low to adequate urine output - ai with sediment. SCD's on and intact, PICC and A-line with Vigileo intact. RLQ abdomen rash is resolving. See assessments and flowsheets for further data.

## 2023-08-04 NOTE — PLAN OF CARE
Received pt at change of shift. Pt intubated and sedated 50% +5. Pt off of all pressors. Pt remains on prop/fent for sedation. Chapa intact. Diminished urine output. Pt AFIB RVR this morning. Lopressor/cardizem given. Pt now converted into sinus rhythm. Low grade temps this evening. Vigileo remains connected to A-line. Pt DNR full treatment now. Will continue to monitor. See MAR/flowsheets for additional information.

## 2023-08-04 NOTE — PROGRESS NOTES
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 CT angiogram of the chest without pneumonia or PE. Trace pleural effusion with atelectasis noted on the right side. CT abdomen pelvis with known liver masses, with question of gluteal cellulitis. Currently on IV cefepime  Course complicated by GI bleed, EGD without any acute abnormalities. Chest imaging on 7/31 with worsening bilateral upper lobe infiltrates, infectious versus related to his cancer therapy. Intubated on 8/1, now status post bronchoscopy. Negative PJP PCR, cultures with no growth to date, AFB smears negative, and fungal smears negative, rest of cultures are pending.   Clinically deteriorating, now requiring vasopressor support     Antibiotics:  Cefepime from 7/25 through 7/31  Meropenem from 7/31 through today  Amphotericin B x1 on 8/1, held due to MAURO  Bactrim on 8/1, held due to MAURO and hyperkalemia  Atovaquone from 8/2 through 8/4    Medications:    dilTIAZem    dilTIAZem    heparin    ipratropium-albuterol    bisacodyl    glucose **OR** glucose **OR** glucose-vitamin C **OR** dextrose **OR** glucose **OR** glucose **OR** glucose-vitamin C    meropenem    lipase-protease-amylase (Lip-Prot-Amyl) **AND** sodium bicarbonate    insulin aspart    methylPREDNISolone    [Held by provider] dextrose in water **AND** [Held by provider] amphotericin b liposomal **AND** [Held by provider] dextrose in water    fentaNYL **OR** fentaNYL    acetaminophen **OR** acetaminophen **OR** acetaminophen **OR** acetaminophen    fentaNYL (Sublimaze) **OR** fentaNYL (Sublimaze)    fentanyl    fleet enema    chlorhexidine gluconate    midazolam    dexmedetomidine    propofol    metoprolol    norepinephrine    vasopressin (Vasostrict) 20 Units in sodium chloride 0.9% 100 mL infusion for septic shock    phenylephrine    [Held by provider] dilTIAZem ER    pantoprazole    sodium chloride    multivitamin with minerals    fluticasone furoate-vilanterol    ondansetron    prochlorperazine     Allergies:    Ibuprofen               HIVES    Comment:Per pt. Had been using ibuprofen with no issues  Penicillins             RASH    Physical Exam:   08/04/23  0906   BP:    Pulse: 113   Resp: 24   Temp:      Vitals signs and nursing note reviewed. Constitutional:       Appearance: Critically ill appearance intubated. HENT:      Head: Normocephalic and atraumatic. Mouth: Mucous membranes are moist.   Neck:      Musculoskeletal: Neck supple. Cardiovascular:      Rate and Rhythm: Normal rate. Heart sounds: Normal heart sounds. No murmur. No friction rub. No gallop. Pulmonary:      Effort: Pulmonary effort is ventilated. No respiratory distress. Breath sounds: Normal breath sounds. No stridor. No wheezing, rhonchi or rales. Chest:      Chest wall: No tenderness. Abdominal:      General: Abdomen is flat. There is no distension. Palpations: Abdomen is soft. There is no mass. Tenderness: There is no tenderness. There is no guarding or rebound. Hernia: No hernia is present. Musculoskeletal:      Right lower leg: No edema. Left lower leg: No edema. Skin:     General: Skin is warm and dry. Laboratory data:  I have reviewed all the lab results independently.   Lab Results   Component Value Date    WBC 10.8 08/04/2023    HGB 7.4 08/04/2023    HCT 23.7 08/04/2023    .0 08/04/2023    CREATSERUM 1.63 08/04/2023    BUN 92 08/04/2023     08/04/2023    K 5.0 08/04/2023     08/04/2023    CO2 31.0 08/04/2023     08/04/2023    CA 7.8 08/04/2023    ALB 2.6 08/04/2023    MG 3.0 08/04/2023    PHOS 4.4 08/04/2023      Recent Labs   Lab 08/02/23  0415 08/02/23  1406 08/03/23  0448 08/04/23  0410   RBC 2.68*  --  2.83* 2.67*   HGB 7.3*   < > 8.0* 7.4*   HCT 23.5*  --  24.7* 23.7*   MCV 87.7  --  87.3 88.8   MCH 27.2  --  28.3 27.7   MCHC 31.1  --  32.4 31.2   RDW 18.4  --  17.6 18.8   NEPRELIM 14.43*  --  9.53*  --    WBC 16.9*  --  11.8* 10.8   .0*  --  445.0 353.0   NEUT 93  --   --   --    LYMPH 3  --   --   --    MON 2  --   --   --    EOS 0  --   --   --    NRBC 3*  --   --   --     < > = values in this interval not displayed. Microbiology data:  Hospital Encounter on 07/24/23   1. Blood Culture     Status: None    Collection Time: 07/24/23 10:13 PM    Specimen: Blood,peripheral   Result Value Ref Range    Blood Culture Result No Growth 5 Days N/A      Impression:  Korey Oliveira is a 45year old male with    Multifocal bilateral pneumonitis with worsening infiltrates seen on CT done on 7/31 with acute hypoxic respiratory failure with threat to life  Patient escalated to IV meropenem on 7/31 due to worsening infiltrates  Started on amphotericin B and Bactrim on 8/1 due to concerns for opportunistic infections   Negative PJP PCR  Fungal and AFB stains all negative  Cultures with no growth to date  Intubated on 8/1, bronchoscopy done on the same day.   Microbiological data is pending  Patient had a negative PPD and quant gold in the past, unlikely TB  Another concern would be related to his other concern is that could be related to his cancer therapy, Tecentriq and Avastin  Now off vasopressors  Hepatocellular carcinoma with metastases  Was on Tecentriq and Avastin as an outpatient  Worsening acute renal failure with hyperkalemia  Concern for Bactrim toxicity  Now on atovaquone, stopped in the setting of negative PJP PCR on 8/4    Recommendations:    Agree with stopping atovaquone  Continue IV meropenem, will plan on a 10-day course through 8/10  Supportive care as per the primary team  Further recommendations will depend clinical progress    The plan of care was discussed with the primary hospital team, Shazia Stuart MD    Remains critically ill, requiring high complexity decision making     Recommendations were also discussed with the patient; all questions were answered. Thank you for this consultation. Please don't hesitate to call the ID team for questions or any acute changes in patient's clinical condition. Please note that this report has been produced using speech recognition software and may contain errors related to that system including, but not limited to, errors in grammar, punctuation, and spelling, as well as words and phrases that possibly may have been recognized inappropriately. If there are any questions or concerns, contact the dictating provider for clarification. The [facility] makes medical notes like these available to patients in the interest of transparency. Please be advised this is a medical document. Medical documents are intended to carry relevant information, facts as evident, and the clinical opinion of the practitioner. The medical note is intended as peer to peer communication and may appear blunt or direct. It is written in medical language and may contain abbreviations or verbiage that are unfamiliar. Scar Dillard MD  [facility]. Tel: [phone]. Fax: [phone].      Hazel Flor : 10/11/1970 MRN: [id number] [facility]: [id number]

## 2023-08-05 NOTE — PLAN OF CARE
Presently sedated to RASS -3, also noted some non purposeful left arm movements (raised left arm up for 15 seconds). Eyes partially open when name is called but does not track nor make eye contact. Pupils are 3B. Regular respiratory pattern with reduction in rate and stopping bicarb drip. #7.5/22, ACPC 20 Pi 25 0.5 +5, pip 30, minimal thick tan with slightly bloody sputum from oropharynx. OG at 65 cm with goal Nepro 1.8 at 35 mL/hr (water flushes on hold with hyponatremia), occasional BM's, abdomen firm and distended, residual checked 10 mL. 1+ edema to extremities (extremely puffy - enough for 4+ edema but the edema is barely pitting). Chapa with low to adequate urine output - hazy ai. SCD's on and intact, PICC and A-line with Vigileo intact. RLQ abdomen rash is resolving. No visitors at present. DNAR/Full. See assessments and flowsheets for further data.

## 2023-08-05 NOTE — PLAN OF CARE
Assumed care of pt after shift report. Received pt orally intubated, sedated with propofol and fentanyl gtts. Precedex gtt added per pulm MD for ventilator compliance. Pi 22, decreased by pulthomas ESPINAL. Esophageal probe intact, low-grade temp. PRN IV acetaminophen. A-fib/a-flutter per monitor. -150s. PRN lopressor IVP ordered. Digoxin x1 given. Right radial a-line with Vigileo intact. Normotensive. OGT intact with continuous tube feedings. Scheduled miralax and senna ordered. BM x1. Chapa intact with adequate urine output. Multiple family members and friends at bedside throughout day. Pt's Wife Mya Corral updated and agreeable to plan of care. See MAR and flowsheets for additional information.

## 2023-08-06 NOTE — PLAN OF CARE
Assumed care of pt after shift report. Received pt orally intubated, sedated with propofol/precedex/fentanyl gtts. Sedation titrated to ventilator compliance. Esophageal probe intact, afebrile. Febrile last night, repeat blood cultures drawn. A-fib/a-flutter per monitor. Cardizem gtt titrated to maintain HR<120. Right radial a-line with Vigileo intact. Normotensive. OGT intact. Pt manually disimpacted this AM. Hard stool from disimpaction, loose stool afterwards. KUB completed. CT abd/pelvis completed. Tube feedings resumed. Chapa intact with adequate urine output. Multiple family members and friends at bedside throughout day. Pt's wife Mery Rivera updated and agreeable to plan of care. See MAR and flowsheets for additional information.

## 2023-08-06 NOTE — PLAN OF CARE
Received pt vented and sedated on propofol, precedex, and fentanyl. Pt with minimum secretions oral or ETT. Pt with abdomen distended and hard with hypoactive bowel sounds. Maroon bowel movement with he red blood present and a quarter size blood clot. Pt with chaidez catheter in place with moderate ai urine output. Tube feed being tolerated well, residuals not check per protocol. Pt febrile during the night, IV tylenol given and placed ice pack under axillas. Scrotum/penis remains swollen. Pt on a flutter, with a heart rate in the 150's dr. Jonnie Ruvalcaba notified. Received order for Cardizem gtt. Will continue to monitor and assess. Problem: Patient/Family Goals  Goal: Patient/Family Long Term Goal  Description: Patient's Long Term Goal: return home    Interventions:  - cooperate with all nursing and medical interventions    - See additional Care Plan goals for specific interventions  Outcome: Not Progressing  Goal: Patient/Family Short Term Goal  Description: Patient's Short Term Goal: wean from vent    Interventions:   - participate in weaning trials  - coping mechanisms to maintain calm    - See additional Care Plan goals for specific interventions  Outcome: Not Progressing     Problem: CARDIOVASCULAR - ADULT  Goal: Maintains optimal cardiac output and hemodynamic stability  Description: INTERVENTIONS:  - Monitor vital signs, rhythm, and trends  - Monitor for bleeding, hypotension and signs of decreased cardiac output  - Evaluate effectiveness of vasoactive medications to optimize hemodynamic stability  - Monitor arterial and/or venous puncture sites for bleeding and/or hematoma  - Assess quality of pulses, skin color and temperature  - Assess for signs of decreased coronary artery perfusion - ex.  Angina  - Evaluate fluid balance, assess for edema, trend weights  Outcome: Not Progressing  Goal: Absence of cardiac arrhythmias or at baseline  Description: INTERVENTIONS:  - Continuous cardiac monitoring, monitor vital signs, obtain 12 lead EKG if indicated  - Evaluate effectiveness of antiarrhythmic and heart rate control medications as ordered  - Initiate emergency measures for life threatening arrhythmias  - Monitor electrolytes and administer replacement therapy as ordered  Outcome: Not Progressing     Problem: RESPIRATORY - ADULT  Goal: Achieves optimal ventilation and oxygenation  Description: INTERVENTIONS:  - Assess for changes in respiratory status  - Assess for changes in mentation and behavior  - Position to facilitate oxygenation and minimize respiratory effort  - Oxygen supplementation based on oxygen saturation or ABGs  - Provide Smoking Cessation handout, if applicable  - Encourage broncho-pulmonary hygiene including cough, deep breathe, Incentive Spirometry  - Assess the need for suctioning and perform as needed  - Assess and instruct to report SOB or any respiratory difficulty  - Respiratory Therapy support as indicated  - Manage/alleviate anxiety  - Monitor for signs/symptoms of CO2 retention  Outcome: Not Progressing     Problem: GASTROINTESTINAL - ADULT  Goal: Maintains or returns to baseline bowel function  Description: INTERVENTIONS:  - Assess bowel function  - Maintain adequate hydration with IV or PO as ordered and tolerated  - Evaluate effectiveness of GI medications  - Encourage mobilization and activity  - Obtain nutritional consult as needed  - Establish a toileting routine/schedule  - Consider collaborating with pharmacy to review patient's medication profile  Outcome: Not Progressing     Problem: METABOLIC/FLUID AND ELECTROLYTES - ADULT  Goal: Glucose maintained within prescribed range  Description: INTERVENTIONS:  - Monitor Blood Glucose as ordered  - Assess for signs and symptoms of hyperglycemia and hypoglycemia  - Administer ordered medications to maintain glucose within target range  - Assess barriers to adequate nutritional intake and initiate nutrition consult as needed  - Instruct patient on self management of diabetes  Outcome: Not Progressing  Goal: Electrolytes maintained within normal limits  Description: INTERVENTIONS:  - Monitor labs and rhythm and assess patient for signs and symptoms of electrolyte imbalances  - Administer electrolyte replacement as ordered  - Monitor response to electrolyte replacements, including rhythm and repeat lab results as appropriate  - Fluid restriction as ordered  - Instruct patient on fluid and nutrition restrictions as appropriate  Outcome: Not Progressing  Goal: Hemodynamic stability and optimal renal function maintained  Description: INTERVENTIONS:  - Monitor labs and assess for signs and symptoms of volume excess or deficit  - Monitor intake, output and patient weight  - Monitor urine specific gravity, serum osmolarity and serum sodium as indicated or ordered  - Monitor response to interventions for patient's volume status, including labs, urine output, blood pressure (other measures as available)  - Encourage oral intake as appropriate  - Instruct patient on fluid and nutrition restrictions as appropriate  Outcome: Not Progressing     Problem: SKIN/TISSUE INTEGRITY - ADULT  Goal: Skin integrity remains intact  Description: INTERVENTIONS  - Assess and document risk factors for pressure ulcer development  - Assess and document skin integrity  - Monitor for areas of redness and/or skin breakdown  - Initiate interventions, skin care algorithm/standards of care as needed  Outcome: Not Progressing     Problem: NEUROLOGICAL - ADULT  Goal: Achieves stable or improved neurological status  Description: INTERVENTIONS  - Assess for and report changes in neurological status  - Initiate measures to prevent increased intracranial pressure  - Maintain blood pressure and fluid volume within ordered parameters to optimize cerebral perfusion and minimize risk of hemorrhage  - Monitor temperature, glucose, and sodium.  Initiate appropriate interventions as ordered  Outcome: Not Progressing     Problem: RISK FOR INFECTION - ADULT  Goal: Absence of fever/infection during anticipated neutropenic period  Description: INTERVENTIONS  - Monitor WBC  - Administer growth factors as ordered  - Implement neutropenic guidelines  Outcome: Not Progressing     Problem: SAFETY ADULT - FALL  Goal: Free from fall injury  Description: INTERVENTIONS:  - Assess pt frequently for physical needs  - Identify cognitive and physical deficits and behaviors that affect risk of falls.   - [place] fall precautions as indicated by assessment.  - Educate pt/family on patient safety including physical limitations  - Instruct pt to call for assistance with activity based on assessment  - Modify environment to reduce risk of injury  - Provide assistive devices as appropriate  - Consider OT/PT consult to assist with strengthening/mobility  - Encourage toileting schedule  Outcome: Not Progressing     Problem: PAIN - ADULT  Goal: Verbalizes/displays adequate comfort level or patient's stated pain goal  Description: INTERVENTIONS:  - Encourage pt to monitor pain and request assistance  - Assess pain using appropriate pain scale  - Administer analgesics based on type and severity of pain and evaluate response  - Implement non-pharmacological measures as appropriate and evaluate response  - Consider cultural and social influences on pain and pain management  - Manage/alleviate anxiety  - Utilize distraction and/or relaxation techniques  - Monitor for opioid side effects  - Notify MD/LIP if interventions unsuccessful or patient reports new pain  - Anticipate increased pain with activity and pre-medicate as appropriate  Outcome: Not Progressing     Problem: Diabetes/Glucose Control  Goal: Glucose maintained within prescribed range  Description: INTERVENTIONS:  - Monitor Blood Glucose as ordered  - Assess for signs and symptoms of hyperglycemia and hypoglycemia  - Administer ordered medications to maintain glucose within target range  - Assess barriers to adequate nutritional intake and initiate nutrition consult as needed  - Instruct patient on self management of diabetes  Outcome: Not Progressing     Problem: Delirium  Goal: Minimize duration of delirium  Description: Interventions:  - Encourage use of hearing aids, eye glasses  - Promote highest level of mobility daily  - Provide frequent reorientation  - Promote wakefulness i.e. lights on, blinds open  - Promote sleep, encourage patient's normal rest cycle i.e. lights off, TV off, minimize noise and interruptions  - Encourage family to assist in orientation and promotion of home routines  Outcome: Not Progressing

## 2023-08-06 NOTE — RESPIRATORY THERAPY NOTE
Received order for CT transport for pt on ventilator. Was unable to use pts current settings of AC/PS 20/ 25/5  40% on the transport vent. Per MD was able to switch to VC 20/400/50%/+5 to be able to use transport vent and make sure pt was being ventilated. Pt maintained sats throughout entire transport, but a high pitch noise was heard coming from pts tube/mouth when pts head of bed was flat during CT. When pts head was brought up to semi fowlers the noise stopped. Placed pt back on ventilator with original settings and was suctioned upon arrival back on the unit. PIP before transport:27/ PIP after transport 38. Plat before transport 26/ Plat after transport 40   Pressures drastically taylor and was noted upon arrival. MD notified, RN aware. Current pressures:  peak:36/plat:28  Chest X-ray was ordered to rule out pneumo. Pt is currently resting on original settings. Will continue to monitor.

## 2023-08-06 NOTE — PROGRESS NOTES
08/06/23 1701   Clinical Encounter Type   Visited With Patient and family together   Routine Visit Follow-up   Crisis Visit Critical care   Protestant Encounters   Protestant Needs Prayer   Taxonomy   Methods Offer emotional support   Interventions [place]

## 2023-08-07 NOTE — PLAN OF CARE
Pt still not completely synchronous with ventilator despite sedation. Tmax 99.5F. Tolerating TF, watery BM with bright red blood x1. Afib/flutter in 100-110's. Problem: CARDIOVASCULAR - ADULT  Goal: Maintains optimal cardiac output and hemodynamic stability  Description: INTERVENTIONS:  - Monitor vital signs, rhythm, and trends  - Monitor for bleeding, hypotension and signs of decreased cardiac output  - Evaluate effectiveness of vasoactive medications to optimize hemodynamic stability  - Monitor arterial and/or venous puncture sites for bleeding and/or hematoma  - Assess quality of pulses, skin color and temperature  - Assess for signs of decreased coronary artery perfusion - ex.  Angina  - Evaluate fluid balance, assess for edema, trend weights  Outcome: Progressing  Goal: Absence of cardiac arrhythmias or at baseline  Description: INTERVENTIONS:  - Continuous cardiac monitoring, monitor vital signs, obtain 12 lead EKG if indicated  - Evaluate effectiveness of antiarrhythmic and heart rate control medications as ordered  - Initiate emergency measures for life threatening arrhythmias  - Monitor electrolytes and administer replacement therapy as ordered  Outcome: Not Progressing     Problem: RESPIRATORY - ADULT  Goal: Achieves optimal ventilation and oxygenation  Description: INTERVENTIONS:  - Assess for changes in respiratory status  - Assess for changes in mentation and behavior  - Position to facilitate oxygenation and minimize respiratory effort  - Oxygen supplementation based on oxygen saturation or ABGs  - Provide Smoking Cessation handout, if applicable  - Encourage broncho-pulmonary hygiene including cough, deep breathe, Incentive Spirometry  - Assess the need for suctioning and perform as needed  - Assess and instruct to report SOB or any respiratory difficulty  - Respiratory Therapy support as indicated  - Manage/alleviate anxiety  - Monitor for signs/symptoms of CO2 retention  Outcome: Progressing Problem: GASTROINTESTINAL - ADULT  Goal: Maintains or returns to baseline bowel function  Description: INTERVENTIONS:  - Assess bowel function  - Maintain adequate hydration with IV or PO as ordered and tolerated  - Evaluate effectiveness of GI medications  - Encourage mobilization and activity  - Obtain nutritional consult as needed  - Establish a toileting routine/schedule  - Consider collaborating with pharmacy to review patient's medication profile  Outcome: Progressing     Problem: METABOLIC/FLUID AND ELECTROLYTES - ADULT  Goal: Glucose maintained within prescribed range  Description: INTERVENTIONS:  - Monitor Blood Glucose as ordered  - Assess for signs and symptoms of hyperglycemia and hypoglycemia  - Administer ordered medications to maintain glucose within target range  - Assess barriers to adequate nutritional intake and initiate nutrition consult as needed  - Instruct patient on self management of diabetes  Outcome: Progressing  Goal: Electrolytes maintained within normal limits  Description: INTERVENTIONS:  - Monitor labs and rhythm and assess patient for signs and symptoms of electrolyte imbalances  - Administer electrolyte replacement as ordered  - Monitor response to electrolyte replacements, including rhythm and repeat lab results as appropriate  - Fluid restriction as ordered  - Instruct patient on fluid and nutrition restrictions as appropriate  Outcome: Progressing  Goal: Hemodynamic stability and optimal renal function maintained  Description: INTERVENTIONS:  - Monitor labs and assess for signs and symptoms of volume excess or deficit  - Monitor intake, output and patient weight  - Monitor urine specific gravity, serum osmolarity and serum sodium as indicated or ordered  - Monitor response to interventions for patient's volume status, including labs, urine output, blood pressure (other measures as available)  - Encourage oral intake as appropriate  - Instruct patient on fluid and nutrition restrictions as appropriate  Outcome: Progressing     Problem: SKIN/TISSUE INTEGRITY - ADULT  Goal: Skin integrity remains intact  Description: INTERVENTIONS  - Assess and document risk factors for pressure ulcer development  - Assess and document skin integrity  - Monitor for areas of redness and/or skin breakdown  - Initiate interventions, skin care algorithm/standards of care as needed  Outcome: Progressing     Problem: NEUROLOGICAL - ADULT  Goal: Achieves stable or improved neurological status  Description: INTERVENTIONS  - Assess for and report changes in neurological status  - Initiate measures to prevent increased intracranial pressure  - Maintain blood pressure and fluid volume within ordered parameters to optimize cerebral perfusion and minimize risk of hemorrhage  - Monitor temperature, glucose, and sodium. Initiate appropriate interventions as ordered  Outcome: Not Progressing     Problem: RISK FOR INFECTION - ADULT  Goal: Absence of fever/infection during anticipated neutropenic period  Description: INTERVENTIONS  - Monitor WBC  - Administer growth factors as ordered  - Implement neutropenic guidelines  Outcome: Progressing     Problem: SAFETY ADULT - FALL  Goal: Free from fall injury  Description: INTERVENTIONS:  - Assess pt frequently for physical needs  - Identify cognitive and physical deficits and behaviors that affect risk of falls.   - [place] fall precautions as indicated by assessment.  - Educate pt/family on patient safety including physical limitations  - Instruct pt to call for assistance with activity based on assessment  - Modify environment to reduce risk of injury  - Provide assistive devices as appropriate  - Consider OT/PT consult to assist with strengthening/mobility  - Encourage toileting schedule  Outcome: Progressing     Problem: PAIN - ADULT  Goal: Verbalizes/displays adequate comfort level or patient's stated pain goal  Description: INTERVENTIONS:  - Encourage pt to monitor pain and request assistance  - Assess pain using appropriate pain scale  - Administer analgesics based on type and severity of pain and evaluate response  - Implement non-pharmacological measures as appropriate and evaluate response  - Consider cultural and social influences on pain and pain management  - Manage/alleviate anxiety  - Utilize distraction and/or relaxation techniques  - Monitor for opioid side effects  - Notify MD/LIP if interventions unsuccessful or patient reports new pain  - Anticipate increased pain with activity and pre-medicate as appropriate  Outcome: Progressing     Problem: Diabetes/Glucose Control  Goal: Glucose maintained within prescribed range  Description: INTERVENTIONS:  - Monitor Blood Glucose as ordered  - Assess for signs and symptoms of hyperglycemia and hypoglycemia  - Administer ordered medications to maintain glucose within target range  - Assess barriers to adequate nutritional intake and initiate nutrition consult as needed  - Instruct patient on self management of diabetes  Outcome: Progressing     Problem: Delirium  Goal: Minimize duration of delirium  Description: Interventions:  - Encourage use of hearing aids, eye glasses  - Promote highest level of mobility daily  - Provide frequent reorientation  - Promote wakefulness i.e. lights on, blinds open  - Promote sleep, encourage patient's normal rest cycle i.e. lights off, TV off, minimize noise and interruptions  - Encourage family to assist in orientation and promotion of home routines  Outcome: Not Progressing

## 2023-08-07 NOTE — PROGRESS NOTES
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 CT angiogram of the chest without pneumonia or PE. Trace pleural effusion with atelectasis noted on the right side. CT abdomen pelvis with known liver masses, with question of gluteal cellulitis. Currently on IV cefepime  Course complicated by GI bleed, EGD without any acute abnormalities. Chest imaging on 7/31 with worsening bilateral upper lobe infiltrates, infectious versus related to his cancer therapy. Intubated on 8/1, now status post bronchoscopy. Negative PJP PCR, cultures with no growth to date, AFB smears negative, and fungal smears negative, rest of cultures are pending.       Antibiotics:  Cefepime from 7/25 through 7/31  Meropenem from 7/31 through today  Amphotericin B x1 on 8/1, held due to MAURO  Bactrim on 8/1, held due to MAURO and hyperkalemia  Atovaquone from 8/2 through 8/4    Medications:    dilTIAZem    albumin human    bumetanide    metoprolol    methylPREDNISolone    polyethylene glycol (PEG 3350)    magnesium hydroxide    bisacodyl    polyethylene glycol (PEG 3350)    senna    dilTIAZem    dilTIAZem    ipratropium-albuterol    heparin    ipratropium-albuterol    bisacodyl    glucose **OR** glucose **OR** glucose-vitamin C **OR** dextrose **OR** glucose **OR** glucose **OR** glucose-vitamin C    meropenem    lipase-protease-amylase (Lip-Prot-Amyl) **AND** sodium bicarbonate    insulin aspart    fentaNYL **OR** fentaNYL    acetaminophen **OR** acetaminophen **OR** acetaminophen **OR** acetaminophen    fentaNYL (Sublimaze) **OR** fentaNYL (Sublimaze) fentanyl    fleet enema    chlorhexidine gluconate    midazolam    dexmedetomidine    propofol    metoprolol    norepinephrine    vasopressin (Vasostrict) 20 Units in sodium chloride 0.9% 100 mL infusion for septic shock    phenylephrine    [Held by provider] dilTIAZem ER    pantoprazole    multivitamin with minerals    fluticasone furoate-vilanterol    ondansetron    prochlorperazine     Allergies:    Ibuprofen               HIVES    Comment:Per pt. Had been using ibuprofen with no issues  Penicillins             RASH    Physical Exam:   08/07/23  0905   BP:    Pulse: 113   Resp: 23   Temp:      Vitals signs and nursing note reviewed. Constitutional:       Appearance: Critically ill appearance intubated. HENT:      Head: Normocephalic and atraumatic. Mouth: Mucous membranes are moist.   Neck:      Musculoskeletal: Neck supple. Cardiovascular:      Rate and Rhythm: Normal rate. Heart sounds: Normal heart sounds. No murmur. No friction rub. No gallop. Pulmonary:      Effort: Pulmonary effort is ventilated. No respiratory distress. Breath sounds: Bilateral scattered rhonchi. Chest:      Chest wall: No tenderness. Abdominal:      General: Abdomen is flat. There is no distension. Palpations: Abdomen is soft. There is no mass. Tenderness: There is no tenderness. There is no guarding or rebound. Hernia: No hernia is present. Musculoskeletal:      Right lower leg: No edema. Left lower leg: No edema. Skin:     General: Skin is warm and dry. Laboratory data:  I have reviewed all the lab results independently.   Lab Results   Component Value Date    WBC 16.2 08/07/2023    HGB 7.3 08/07/2023    HCT 23.6 08/07/2023    .0 08/07/2023    CREATSERUM 1.29 08/07/2023     08/07/2023     08/07/2023    K 4.6 08/07/2023     08/07/2023    CO2 27.0 08/07/2023     08/07/2023    CA 7.9 08/07/2023    ALB 3.7 08/07/2023    MG 3.1 08/07/2023    PHOS 4.8 08/07/2023      Recent Labs   Lab 08/02/23  0415 08/02/23  1406 08/03/23  0448 08/04/23  0410 08/07/23  0428   RBC 2.68*  --  2.83*   < > 2.55*   HGB 7.3*   < > 8.0*   < > 7.3*   HCT 23.5*  --  24.7*   < > 23.6*   MCV 87.7  --  87.3   < > 92.5   MCH 27.2  --  28.3   < > 28.6   MCHC 31.1  --  32.4   < > 30.9*   RDW 18.4  --  17.6   < > 20.8   NEPRELIM 14.43*  --  9.53*  --   --    WBC 16.9*  --  11.8*   < > 16.2*   .0*  --  445.0   < > 277.0   NEUT 93  --   --   --   --    LYMPH 3  --   --   --   --    MON 2  --   --   --   --    EOS 0  --   --   --   --    NRBC 3*  --   --   --   --     < > = values in this interval not displayed. Microbiology data:  Hospital Encounter on 07/24/23   1. Blood Culture     Status: Abnormal (Preliminary result)    Collection Time: 08/06/23  9:10 AM    Specimen: Bld,Picc Line; Blood   Result Value Ref Range    Blood Culture Smear Gram positive cocci in clusters (A) N/A     Impression:  Arielle Will is a 45year old male with    Multifocal bilateral pneumonitis with worsening infiltrates seen on CT done on 7/31 with acute hypoxic respiratory failure with threat to life  Patient escalated to IV meropenem on 7/31 due to worsening infiltrates  Started on amphotericin B and Bactrim on 8/1 due to concerns for opportunistic infections   Negative PJP PCR  Fungal and AFB stains all negative  Cultures with no growth to date  Both stopped at this point  Intubated on 8/1, bronchoscopy done on the same day.   Microbiological data is pending  Patient had a negative PPD and quant gold in the past, unlikely TB  Another concern would be related to his other concern is that could be related to his cancer therapy, Tecentriq and Avastin  Now off vasopressors  1 episode of fever over the last 24 hours  Could be related to underlying infection  Could be related to his malignancy  The patient remains otherwise hemodynamically the same  PICC line site without evidence of infection  Repeat blood cultures showing staph epi from the 1 from the PICC line. Peripheral blood cultures negative. Most likely pseudo bacteremia  Hepatocellular carcinoma with metastases  Was on Tecentriq and Avastin as an outpatient  Acute renal failure  Renal medicine following    Recommendations:    Continue IV meropenem  Supportive care as per critical care team  Agree with palliative care consultation. Further recommendations will depend clinical progress    The plan of care was discussed with the primary hospital team, Ariana Washburn MD     Recommendations were also discussed with the patient; all questions were answered. Thank you for this consultation. Please don't hesitate to call the ID team for questions or any acute changes in patient's clinical condition. Please note that this report has been produced using speech recognition software and may contain errors related to that system including, but not limited to, errors in grammar, punctuation, and spelling, as well as words and phrases that possibly may have been recognized inappropriately. If there are any questions or concerns, contact the dictating provider for clarification. The [facility] makes medical notes like these available to patients in the interest of transparency. Please be advised this is a medical document. Medical documents are intended to carry relevant information, facts as evident, and the clinical opinion of the practitioner. The medical note is intended as peer to peer communication and may appear blunt or direct. It is written in medical language and may contain abbreviations or verbiage that are unfamiliar. Angel Rosales MD  [facility]. Tel: [phone]. Fax: [phone].      Laura Riddle : 10/11/1970 MRN: [id number] [facility]: [id number]

## 2023-08-07 NOTE — PLAN OF CARE
Assumed care of pt after shift report. Received pt orally intubated, sedated with propofol/precedex/fentanyl gtts. Sedation titrated to ventilator compliance. Esophageal probe intact, afebrile. A-fib/a-flutter per monitor this AM. Sinus arrhythmia this afternoon, HR<120, cardizem gtt weaned off. Right radial a-line. Normotensive. Timothy red bloody stool this AM, GI reconsulted. Heparin held. OGT intact. Tube feedings resumed. Chapa intact with adequate urine output. Multiple family members and friends at bedside throughout day. Pt's wife Lexi Huff updated and agreeable to plan of care. See MAR and flowsheets for additional information.

## 2023-08-07 NOTE — CHILD LIFE NOTE
Child life received call from [facility] ([facility]) team requesting support for patient's wife and children (more specifically the youngest son who is in 8th grade). [facility] requesting that child life team reach out to patient's wife tomorrow morning to provide information on how to initiate conversation and talk with patient's son about his medical status. Child life will reach out to patient's wife in the morning to determine how they can best support the family. [address] [address] [place], [address], [address], [address] [facility]

## 2023-08-08 PROBLEM — Z51.5 END OF LIFE CARE: Status: ACTIVE | Noted: 2023-01-01

## 2023-08-08 NOTE — PROGRESS NOTES
BRIEF DULY GI PROGRESS NOTE    Case reviewed w/ ICU Attending (Dr. Tirso Mathew) at length this morning. Still w/ some persistent maroon rectal output in setting of visible external hemorrhoid, suspected IGNACIO possibly d/t stercoral ulceration or trauma w/ recent impaction (disimpacted by nursing which relieved the obstruction). Overall prognosis is poor and family is actively considering transition to comfort care. - Unlikely to tolerate prep or COL at this time, high-risk for complications. - Could potentially offer a diag FS at bedside if intractable/severe IGNACIO, but unlikely to alter course/mgmt given limitations of visualization and if comfort care pursued. - D/w'd ICU Attending given overall concerns, and we both agree it would be best to hold off on any endoscopic procedures for now. Could readdress if bleeding becomes an impediment to consideration of further GOCs. - Will sign off, please call if questions/issues arise.     Alexander Law MD  [address]  Department of Gastroenterology

## 2023-08-08 NOTE — PROGRESS NOTES
The patient is transitioning to hospice, comfort care. Infectious disease signing off, thank you for allowing us to participate in the care of this patient.

## 2023-08-08 NOTE — PROGRESS NOTES
08/08/23 1242   Clinical Encounter Type   Visited With Patient; Family   Taxonomy   Intended Effects Preserve dignity and respect   Methods Offer emotional support; Offer spiritual/Alevism support   Interventions Acknowledge current situation; Active listening; Ask guided questions;[place];Provide sacred reading(s)      responded to call for end of life support for family.  greeted patients wife Mery Rivera, and children Terrance Celestin, and Cindy Diana asked what type of support needed. Rula asked for prayer.  offered reading of scripture aligned with Nondenominational carolyn for comfort followed by spontaneous prayer and recitation of the Lord's prayer. Rula and family showed appreciation for visit.  remains available for support. Spiritual Care support can be requested via an Epic consult. For urgent/immediate needs, please contact the On Call  at ext. [phone]. [employer]. Div  Extension:[phone]

## 2023-08-08 NOTE — PLAN OF CARE
Received pt orally intubated to vent, O2 at 55% Pt using his accessory muscles to breath. No spontaneous movement or movement to pain. On Propofol, fentanyl and Precedex for comfort/pain. Went into a flutter again, ICU and  Cards APN informed. Pt having maroon stools, ICU APN and GI MD informed. Family spoke with palliative Care RN and decision made to make pt Comfort status. Plan to wean off vent at 1700.  notified and saw pt. Pt extubated at 1815 Propofol, fentanyl and Precedex dc'd prior. Pt on Dilaudid and Ativan for comfort. Pt went asytole and pronounced at 1832.

## 2023-08-08 NOTE — PLAN OF CARE
Took over pt care after change of shift report. Received pt intubated and sedated on ventilator, FiO2 55%, +5, tolerating well. Propofol gtt, precedex gtt, and fentanyl gtt infusing for sedation/vent compliance and pain. See MAR. Pt does not withdrawal to painful stimuli. Max temp 100.6. IV acetaminophen given per orders. Ice packs applied. Afib observed on tele. Pt converted to ST around 0000. Normotensive. R. A-line intact. OG with continuous tube feeds. 1 BM. C-diff sample sent. Chapa catheter intact. Pt appears to be resting comfortably in bed at this time. See MAR. See flowsheets for further assessments.

## 2023-08-08 NOTE — CM/SW NOTE
Care Progression Note:  Length of stay: 14  GMLOS: 5  Avoidable Delays:   Code Status: DNAR/Comfort     Acute Medical Issue/Factors:   Hyponatremia Pt's family met w/ PC this am, pt transitioning to comfort care. Plans to wean off the vent at 1700. Discharge Barriers: Pt transitioning to comfort care/ not stable for transfer     / available for discharge planning.      AYALA BucknerN, [facility]    [id number]

## 2023-08-08 NOTE — PROGRESS NOTES
23 1849   Clinical Encounter Type   Visited With Family; Health care provider   Crisis Visit Patient actively dying   Referral From Verbal;Nurse   Referral To    Family Spiritual Encounters   Family Coping Sadness   Taxonomy   Intended Effects Promote a sense of peace   Methods Offer spiritual/Mandaen support; Offer emotional support   Interventions Acknowledge current situation;[place];Respond as  to a defined crisis event; Active listening     Discussion:     received call from Pt's RN requesting that  come as patient was actively dying.  arrived. Pt (Pedro) was surrounded by family who tearful and sad. Family requested a prayer.  prayed for light and love to welcome [place] and comfort his family. Pedro  shortly after.  gave family privacy as they grieved and will continue to check in on them and prepare bereavement resources.  is available for continued grief support at ext. [phone].      Lotus Bruno M.Div, Chaplain Resident  Ext. [phone]

## 2023-08-09 NOTE — PAYOR COMM NOTE
--------------  DISCHARGE REVIEW    Payor: [facility] PPO  Subscriber #:  [id number]  Authorization Number: [id number]    Admit date: 7/25/23  Admit time:  11:09 AM  Discharge Date: 8/8/2023  6:32 PM     Admitting Physician: Burnie Schirmer, MD  Attending Physician:  No att. providers found  Primary Care Physician: Augie Segovia MD

## 2023-08-11 NOTE — DISCHARGE SUMMARY
[facility]    DISCHARGE SUMMARY/Death Note     Deana Antonio Patient Status:  Inpatient    10/11/1970 MRN [id number]   [facility] 4SW-A Attending No att. providers found   [facility] 15 PCP Tracey Brewer MD     Date of Admission: 2023  Date of Discharge: 2023  Discharge Disposition:     Discharge Diagnosis: Acute Respiratory Failure with hypoxia, Sepsis, Hypotension, Bacteremia, Hyponatremia, MAURO, Hyperkalemia, new onset a-fib with rvr, acute blood loss anemia, asthma exacerbation, SBO, Hepatocellular carcinoma with lung metastases    History of Present Illness/Hospital Course: 45 yr old male with PMH sig for [address] with mets to the lungs, HTN, and HLD who presented to the ED for evaluation of shortness of breath and fatigue. Patient was seen by ID, Nephrology, Heme/Onc, Pulm/Critical Care, GI and cardiology. Patient was treated with IV abx for underlying infections which included bacteremia and possible pna. Patient was intubated for underlying respiratory failure and was treated with iv abx for possible infection, steroids for possible pneumonitis. Patient also underwent a bronch but non of the cx grew anything. Patient was on pressors at one point but was titrated off. Patient also had hyponatremia, hyperkalemia and mauro that were treated with the aid of nephrology. Patient was seen by cardiology for treatment of her atrial fibrillation. Patient did convert to NSR while in the iCU. Patient was seen [facility] GI and underwent a EGD that showed no acute pathology to explain pt black tarry stool. Patient was transfused at one point with following hgb remaining stable. Patient was seen by Heme/onc for his underlying hepatocellular carcinoma with lung mets. Patient clinically continue to remain intubated without much improvement. Family elected to pursue comfort measures as results. Patient was extubated and passed at [address]. Family was made aware.       MD Donna Medina [facility] and [facility] Hospitalist  [phone]    Time spent:  > 35 minutes

## (undated) DEVICE — BIOGUARD CLEANING ADAPTER

## (undated) DEVICE — 1200CC GUARDIAN II: Brand: GUARDIAN

## (undated) DEVICE — BLOCK BITE MAXI 60FR

## (undated) DEVICE — GLOVE SURG SENSICARE SZ 7

## (undated) DEVICE — 3M™ RED DOT™ MONITORING ELECTRODE WITH FOAM TAPE AND STICKY GEL, 50/BAG, 20/CASE, 72/PLT 2570: Brand: RED DOT™

## (undated) DEVICE — 10FT COMBINED O2 DELIVERY/CO2 MONITORING. FILTER WITH MICROSTREAM TYPE LUER: Brand: DUAL ADULT NASAL CANNULA

## (undated) DEVICE — KIT ENDO ORCAPOD 160/180/190

## (undated) NOTE — LETTER
[facility] [facility] - [place] FOR BLOOD [place] BLOOD COMPONENTS      In the course of your treatment, it may become necessary to administer a transfusion of blood or blood components. This form provides basic information concerning this procedure and, if signed by you, authorizes its performance by qualified medical personnel. DESCRIPTION OF PROCEDURE:  Blood is introduced into one of your veins, commonly in the arm, using a sterilized disposable needle. The amount of blood transfused, and whether the transfusion will be of blood or blood components is a judgment the physician will make based on your particular needs. RISKS:  The transfusion is a common procedure of low risk. MINOR AND TEMPORARY REACTIONS ARE NOT UNCOMMON, including a slight bruise, swelling or local reaction in the area where the needle pierces your skin, or a non-serious reaction to the transfused material itself, including headache, fever or a mild skin reaction, such as rash. Serious reactions are possible, though very unlikely and include severe allergic reaction (shock)  and destruction (hemolysis) of transfused blood cells. Infectious diseases which are known to be transmitted by blood transfusion include CERTAIN TYPES OF VIRAL HEPATITIS, a viral infection of the liver, HUMAN IMMUNODEFICIENCY VIRUS (HIV-1,2) infection, a viral infection known to cause ACQUIRED IMMUNODEFICIENCY SYNDROME (AIDS) AS WELL AS CERTAIN OTHER BACTERIAL, VIRAL AND PARASITIC DISEASES. While a minimal risk of acquiring an infectious disease from transfused blood exists, in accordance with Federal and State law all due care has been taken in donor selection and testing to avoid transmission of disease. ALTERNATIVES:  If loss of blood poses serious threats in the course of your treatment, THERE IS NO EFFECTIVE ALTERNATIVE TO BLOOD TRANSFUSION.  However, if you have any further questions on this matter, your physician will fully explain the alternatives to you if it has not already been done. I,Pedro Darby, have read/had read to me the above. I understand the matters bearing on the decision whether or not to authorize a transfusion of blood or blood components. I have no questions which have not been answered to my full satisfaction.  I hereby consent to such transfusion as  my physician may deem necessary or advisable in the course of my treatment.        _______________   __________________________________________________  Date     Signature of Patient, Parent or Legal Guardian      ([facility] [facility])      __________________________________________  Witness to Signature (title or relationship to patient)    Patient Name: Candy      : 10/11/1970                 Printed: 2023     Medical Record #: [id number]                    Page 1 of 1